# Patient Record
Sex: FEMALE | Race: WHITE | NOT HISPANIC OR LATINO | URBAN - METROPOLITAN AREA
[De-identification: names, ages, dates, MRNs, and addresses within clinical notes are randomized per-mention and may not be internally consistent; named-entity substitution may affect disease eponyms.]

---

## 2021-05-13 ENCOUNTER — INPATIENT (INPATIENT)
Facility: HOSPITAL | Age: 51
LOS: 0 days | Discharge: HOME | End: 2021-05-14
Attending: INTERNAL MEDICINE | Admitting: INTERNAL MEDICINE
Payer: MEDICAID

## 2021-05-13 VITALS
HEIGHT: 64 IN | RESPIRATION RATE: 16 BRPM | OXYGEN SATURATION: 100 % | WEIGHT: 149.91 LBS | DIASTOLIC BLOOD PRESSURE: 78 MMHG | TEMPERATURE: 98 F | HEART RATE: 80 BPM | SYSTOLIC BLOOD PRESSURE: 128 MMHG

## 2021-05-13 DIAGNOSIS — E83.39 OTHER DISORDERS OF PHOSPHORUS METABOLISM: ICD-10-CM

## 2021-05-13 DIAGNOSIS — R56.9 UNSPECIFIED CONVULSIONS: ICD-10-CM

## 2021-05-13 LAB
ALBUMIN SERPL ELPH-MCNC: 3.6 G/DL — SIGNIFICANT CHANGE UP (ref 3.3–5)
ALP SERPL-CCNC: 72 U/L — SIGNIFICANT CHANGE UP (ref 40–120)
ALT FLD-CCNC: 39 U/L — SIGNIFICANT CHANGE UP (ref 12–78)
ANION GAP SERPL CALC-SCNC: 5 MMOL/L — SIGNIFICANT CHANGE UP (ref 5–17)
APPEARANCE UR: CLEAR — SIGNIFICANT CHANGE UP
AST SERPL-CCNC: 36 U/L — SIGNIFICANT CHANGE UP (ref 15–37)
BACTERIA # UR AUTO: ABNORMAL
BASOPHILS # BLD AUTO: 0.05 K/UL — SIGNIFICANT CHANGE UP (ref 0–0.2)
BASOPHILS NFR BLD AUTO: 0.4 % — SIGNIFICANT CHANGE UP (ref 0–2)
BILIRUB SERPL-MCNC: 0.2 MG/DL — SIGNIFICANT CHANGE UP (ref 0.2–1.2)
BILIRUB UR-MCNC: NEGATIVE — SIGNIFICANT CHANGE UP
BUN SERPL-MCNC: 13 MG/DL — SIGNIFICANT CHANGE UP (ref 7–23)
CALCIUM SERPL-MCNC: 8.8 MG/DL — SIGNIFICANT CHANGE UP (ref 8.5–10.1)
CHLORIDE SERPL-SCNC: 104 MMOL/L — SIGNIFICANT CHANGE UP (ref 96–108)
CO2 SERPL-SCNC: 27 MMOL/L — SIGNIFICANT CHANGE UP (ref 22–31)
COLOR SPEC: YELLOW — SIGNIFICANT CHANGE UP
CREAT SERPL-MCNC: 0.74 MG/DL — SIGNIFICANT CHANGE UP (ref 0.5–1.3)
DIFF PNL FLD: ABNORMAL
EOSINOPHIL # BLD AUTO: 0 K/UL — SIGNIFICANT CHANGE UP (ref 0–0.5)
EOSINOPHIL NFR BLD AUTO: 0 % — SIGNIFICANT CHANGE UP (ref 0–6)
EPI CELLS # UR: SIGNIFICANT CHANGE UP
GLUCOSE SERPL-MCNC: 104 MG/DL — HIGH (ref 70–99)
GLUCOSE UR QL: NEGATIVE MG/DL — SIGNIFICANT CHANGE UP
HCG UR QL: NEGATIVE — SIGNIFICANT CHANGE UP
HCT VFR BLD CALC: 38.7 % — SIGNIFICANT CHANGE UP (ref 34.5–45)
HGB BLD-MCNC: 13.1 G/DL — SIGNIFICANT CHANGE UP (ref 11.5–15.5)
IMM GRANULOCYTES NFR BLD AUTO: 0.3 % — SIGNIFICANT CHANGE UP (ref 0–1.5)
KETONES UR-MCNC: NEGATIVE — SIGNIFICANT CHANGE UP
LEUKOCYTE ESTERASE UR-ACNC: NEGATIVE — SIGNIFICANT CHANGE UP
LYMPHOCYTES # BLD AUTO: 1.59 K/UL — SIGNIFICANT CHANGE UP (ref 1–3.3)
LYMPHOCYTES # BLD AUTO: 12.1 % — LOW (ref 13–44)
MAGNESIUM SERPL-MCNC: 2.4 MG/DL — SIGNIFICANT CHANGE UP (ref 1.6–2.6)
MCHC RBC-ENTMCNC: 30.3 PG — SIGNIFICANT CHANGE UP (ref 27–34)
MCHC RBC-ENTMCNC: 33.9 GM/DL — SIGNIFICANT CHANGE UP (ref 32–36)
MCV RBC AUTO: 89.4 FL — SIGNIFICANT CHANGE UP (ref 80–100)
MONOCYTES # BLD AUTO: 0.89 K/UL — SIGNIFICANT CHANGE UP (ref 0–0.9)
MONOCYTES NFR BLD AUTO: 6.8 % — SIGNIFICANT CHANGE UP (ref 2–14)
NEUTROPHILS # BLD AUTO: 10.55 K/UL — HIGH (ref 1.8–7.4)
NEUTROPHILS NFR BLD AUTO: 80.4 % — HIGH (ref 43–77)
NITRITE UR-MCNC: NEGATIVE — SIGNIFICANT CHANGE UP
NRBC # BLD: 0 /100 WBCS — SIGNIFICANT CHANGE UP (ref 0–0)
PH UR: 6 — SIGNIFICANT CHANGE UP (ref 5–8)
PHOSPHATE SERPL-MCNC: 0.6 MG/DL — CRITICAL LOW (ref 2.5–4.5)
PLATELET # BLD AUTO: 260 K/UL — SIGNIFICANT CHANGE UP (ref 150–400)
POTASSIUM SERPL-MCNC: 4.3 MMOL/L — SIGNIFICANT CHANGE UP (ref 3.5–5.3)
POTASSIUM SERPL-SCNC: 4.3 MMOL/L — SIGNIFICANT CHANGE UP (ref 3.5–5.3)
PROT SERPL-MCNC: 7.7 GM/DL — SIGNIFICANT CHANGE UP (ref 6–8.3)
PROT UR-MCNC: NEGATIVE MG/DL — SIGNIFICANT CHANGE UP
RBC # BLD: 4.33 M/UL — SIGNIFICANT CHANGE UP (ref 3.8–5.2)
RBC # FLD: 13.4 % — SIGNIFICANT CHANGE UP (ref 10.3–14.5)
RBC CASTS # UR COMP ASSIST: ABNORMAL /HPF (ref 0–4)
SODIUM SERPL-SCNC: 136 MMOL/L — SIGNIFICANT CHANGE UP (ref 135–145)
SP GR SPEC: 1.01 — SIGNIFICANT CHANGE UP (ref 1.01–1.02)
UROBILINOGEN FLD QL: NEGATIVE MG/DL — SIGNIFICANT CHANGE UP
WBC # BLD: 13.12 K/UL — HIGH (ref 3.8–10.5)
WBC # FLD AUTO: 13.12 K/UL — HIGH (ref 3.8–10.5)

## 2021-05-13 PROCEDURE — 99291 CRITICAL CARE FIRST HOUR: CPT

## 2021-05-13 PROCEDURE — 72125 CT NECK SPINE W/O DYE: CPT | Mod: 26

## 2021-05-13 PROCEDURE — 99222 1ST HOSP IP/OBS MODERATE 55: CPT

## 2021-05-13 PROCEDURE — 93010 ELECTROCARDIOGRAM REPORT: CPT

## 2021-05-13 PROCEDURE — 70450 CT HEAD/BRAIN W/O DYE: CPT | Mod: 26

## 2021-05-13 RX ORDER — LEVOTHYROXINE SODIUM 125 MCG
50 TABLET ORAL DAILY
Refills: 0 | Status: DISCONTINUED | OUTPATIENT
Start: 2021-05-13 | End: 2021-05-14

## 2021-05-13 RX ORDER — LEVETIRACETAM 250 MG/1
2000 TABLET, FILM COATED ORAL ONCE
Refills: 0 | Status: COMPLETED | OUTPATIENT
Start: 2021-05-13 | End: 2021-05-13

## 2021-05-13 RX ORDER — POTASSIUM PHOSPHATE, MONOBASIC POTASSIUM PHOSPHATE, DIBASIC 236; 224 MG/ML; MG/ML
30 INJECTION, SOLUTION INTRAVENOUS ONCE
Refills: 0 | Status: COMPLETED | OUTPATIENT
Start: 2021-05-13 | End: 2021-05-13

## 2021-05-13 RX ADMIN — LEVETIRACETAM 2000 MILLIGRAM(S): 250 TABLET, FILM COATED ORAL at 21:01

## 2021-05-13 RX ADMIN — Medication 30 MILLIGRAM(S): at 23:43

## 2021-05-13 RX ADMIN — POTASSIUM PHOSPHATE, MONOBASIC POTASSIUM PHOSPHATE, DIBASIC 83.33 MILLIMOLE(S): 236; 224 INJECTION, SOLUTION INTRAVENOUS at 21:01

## 2021-05-13 NOTE — ED ADULT TRIAGE NOTE - CHIEF COMPLAINT QUOTE
Seizure today, hit head on night stand, takes Keppra for seizure, H/O psych and is requesting a family member to stay with her Seizure today, hit head on night stand, takes Keppra for seizure, H/O psych and is requesting a family member to stay with her,    Covid vaccine completed Seizure today, hit head on night stand, takes Keppra for seizure, H/O psych and is requesting a family member to stay with her, forehead hematoma,    Covid vaccine completed

## 2021-05-13 NOTE — ED PROVIDER NOTE - OBJECTIVE STATEMENT
This patient is a 51 year old woman hx of seizure disorder on Keppra who presents to the ER after a seizure episode.  Patient was laying in bed when she had a seizure.  She believes that she may have hit head on the dresser that was next to her bed.  Currently in the ER she c/o headache at the area of swelling on the forehead.  She denies vomiting or neck pain.  Patient states that she is compliant with her medications.  Her last seizure was one week ago and the time prior to that was 5 months ago.  Her medications was recently increased 3 days ago by her neurologist.

## 2021-05-13 NOTE — ED ADULT NURSE NOTE - CHIEF COMPLAINT QUOTE
Seizure today, hit head on night stand, takes Keppra for seizure, H/O psych and is requesting a family member to stay with her, forehead hematoma,    Covid vaccine completed

## 2021-05-13 NOTE — H&P ADULT - HISTORY OF PRESENT ILLNESS
this is very pleasant 50 yo female with PMH of seizure disorder follow up Dr Ayesha LOYD, hypothyroidism presented to ED for seizure at home .as per pt. she had seizure last week and went to see her neurologist Dr Hodge increased keppra to 1.75 gm at am and 2 gm at pm since 5/10/2021. she has been faithfully taking her meds. today she felt tired and went to bed and then she had a seizure. it associated postictal confusion, tongue biting and urinary incontinence. she also bumped her head to dresser since she has forehead hematoma.. she denies fever chills. no dysuria or diarrhea. she reported good appetite. she has no other co

## 2021-05-13 NOTE — H&P ADULT - PROBLEM SELECTOR PLAN 1
has no focal neurologic deficits  c.w neuro check, seizure precaution  c.w keppra 1.75 mg at am and 2 gm at pm.   consider neurology consult

## 2021-05-13 NOTE — H&P ADULT - NSHPPHYSICALEXAM_GEN_ALL_CORE
Physical exam:   General: patient in no acute distress, resting comfortably  Head:  forehead hematoma   Eyes: EOMI, PERRLA, clear sclera  Neck: Supple, thyroid nontender, non enlarged  Cardio: S1/S2 +ve, regular rate and rhythm, no M/G/R  Resp: clear to ausculation bilaterally, no rales or wheezes  GI: abdomen soft, nontender, non distended, no guarding, BS +ve x 4  Ext: no significant pedal edema  Neuro: CN 2-12 intact, no significant motor or sensory deficits.  Skin: No rashes or lesions

## 2021-05-13 NOTE — ED PROVIDER NOTE - CRITICAL CARE ATTENDING CONTRIBUTION TO CARE
Patient with hx of seizure neurologically intact said to be compliant with medications.  Critical hypophosphatemia on labs IV infusion started.

## 2021-05-13 NOTE — ED PROVIDER NOTE - CLINICAL SUMMARY MEDICAL DECISION MAKING FREE TEXT BOX
Patient hx of seizure recent increase in her seizure medications patient with hematoma to forehead.  Will check CT r/o ICH.  Patient compliant with medication will check labs for electrolytes.

## 2021-05-13 NOTE — H&P ADULT - NSHPLABSRESULTS_GEN_ALL_CORE
Vital Signs Last 24 Hrs  T(C): 36.7 (13 May 2021 20:11), Max: 36.7 (13 May 2021 17:49)  T(F): 98.1 (13 May 2021 20:11), Max: 98.1 (13 May 2021 20:11)  HR: 85 (13 May 2021 20:11) (80 - 85)  BP: 122/68 (13 May 2021 20:11) (122/68 - 128/78)  BP(mean): --  RR: 20 (13 May 2021 20:11) (16 - 20)  SpO2: 100% (13 May 2021 20:11) (100% - 100%)    CBC Full  -  ( 13 May 2021 18:56 )  WBC Count : 13.12 K/uL  RBC Count : 4.33 M/uL  Hemoglobin : 13.1 g/dL  Hematocrit : 38.7 %  Platelet Count - Automated : 260 K/uL  Mean Cell Volume : 89.4 fl  Mean Cell Hemoglobin : 30.3 pg  Mean Cell Hemoglobin Concentration : 33.9 gm/dL  Auto Neutrophil # : 10.55 K/uL  Auto Lymphocyte # : 1.59 K/uL  Auto Monocyte # : 0.89 K/uL  Auto Eosinophil # : 0.00 K/uL  Auto Basophil # : 0.05 K/uL  Auto Neutrophil % : 80.4 %  Auto Lymphocyte % : 12.1 %  Auto Monocyte % : 6.8 %  Auto Eosinophil % : 0.0 %  Auto Basophil % : 0.4 %    05-13    136  |  104  |  13  ----------------------------<  104<H>  4.3   |  27  |  0.74    Ca    8.8      13 May 2021 18:56  Phos  0.6     05-13  Mg     2.4     05-13    TPro  7.7  /  Alb  3.6  /  TBili  0.2  /  DBili  x   /  AST  36  /  ALT  39  /  AlkPhos  72  05-13      Home Medications:  Keppra:  (13 May 2021 21:00)  levothyroxine:  (13 May 2021 21:00)  PARoxetine 30 mg oral tablet: 1 tab(s) orally once a day (13 May 2021 21:00)  Paxil:  (13 May 2021 21:00)    LIVER FUNCTIONS - ( 13 May 2021 18:56 )  Alb: 3.6 g/dL / Pro: 7.7 gm/dL / ALK PHOS: 72 U/L / ALT: 39 U/L / AST: 36 U/L / GGT: x

## 2021-05-13 NOTE — H&P ADULT - NSHPREVIEWOFSYSTEMS_GEN_ALL_CORE
Constitutional: no fever, chills, night sweats  Ears: no hearing changes or ear pain,   Nose: no nasal congestion, sinus pain, or rhinorrhea  Cardio: no chest pain, orthopnea, edema, or palpitations  Resp: no dyspnea, cough, wheezing  GI: no nausea, vomiting, diarrhea, constipation, hematochezia, or melena  : no dysuria, urinary frequency, hematuria  MSK: no back pain, neck pain  Skin: no rash, pruritis   Neuro: seizures and hematoma on forehead

## 2021-05-13 NOTE — ED ADULT NURSE NOTE - OBJECTIVE STATEMENT
pt A&Ox4 s/p seizure today. pt sustained head injury. hematoma to forehead. pt also with c/o nausea. pt reports LOC. Last seizure was last week. per sister at bedside, Keppa dose was increased on Monday. denies Dizziness, CP and SOB. PMH Hypothyroidism, Depression, Seizures

## 2021-05-13 NOTE — H&P ADULT - ASSESSMENT
this is very pleasant 52 yo female with PMH of seizure disorder follow up Dr Ayesha LOYD, hypothyroidism presented to ED for seizure at home .as per pt. she had seizure last week and went to see her neurologist Dr Hodge increased keppra to 1.75 gm at am and 2 gm at pm since 5/10/2021. she has been faithfully taking her meds. today she felt tired and went to bed and then she had a seizure. it associated postictal confusion, tongue biting and urinary incontinence. she also bumped her head to dresser since she has forehead hematoma.. she denies fever chills. no dysuria or diarrhea. she reported good appetite. she has no other co

## 2021-05-14 VITALS
OXYGEN SATURATION: 98 % | HEART RATE: 85 BPM | SYSTOLIC BLOOD PRESSURE: 116 MMHG | RESPIRATION RATE: 17 BRPM | DIASTOLIC BLOOD PRESSURE: 72 MMHG | TEMPERATURE: 98 F

## 2021-05-14 LAB
ANION GAP SERPL CALC-SCNC: 5 MMOL/L — SIGNIFICANT CHANGE UP (ref 5–17)
BUN SERPL-MCNC: 10 MG/DL — SIGNIFICANT CHANGE UP (ref 7–23)
CALCIUM SERPL-MCNC: 8.4 MG/DL — LOW (ref 8.5–10.1)
CHLORIDE SERPL-SCNC: 106 MMOL/L — SIGNIFICANT CHANGE UP (ref 96–108)
CO2 SERPL-SCNC: 29 MMOL/L — SIGNIFICANT CHANGE UP (ref 22–31)
COVID-19 SPIKE DOMAIN AB INTERP: POSITIVE
COVID-19 SPIKE DOMAIN ANTIBODY RESULT: >250 U/ML — HIGH
CREAT SERPL-MCNC: 0.66 MG/DL — SIGNIFICANT CHANGE UP (ref 0.5–1.3)
FLUAV AG NPH QL: SIGNIFICANT CHANGE UP
FLUBV AG NPH QL: SIGNIFICANT CHANGE UP
GLUCOSE SERPL-MCNC: 92 MG/DL — SIGNIFICANT CHANGE UP (ref 70–99)
HCT VFR BLD CALC: 36.9 % — SIGNIFICANT CHANGE UP (ref 34.5–45)
HGB BLD-MCNC: 12.4 G/DL — SIGNIFICANT CHANGE UP (ref 11.5–15.5)
MCHC RBC-ENTMCNC: 30.3 PG — SIGNIFICANT CHANGE UP (ref 27–34)
MCHC RBC-ENTMCNC: 33.6 GM/DL — SIGNIFICANT CHANGE UP (ref 32–36)
MCV RBC AUTO: 90.2 FL — SIGNIFICANT CHANGE UP (ref 80–100)
NRBC # BLD: 0 /100 WBCS — SIGNIFICANT CHANGE UP (ref 0–0)
PHOSPHATE SERPL-MCNC: 3.6 MG/DL — SIGNIFICANT CHANGE UP (ref 2.5–4.5)
PLATELET # BLD AUTO: 231 K/UL — SIGNIFICANT CHANGE UP (ref 150–400)
POTASSIUM SERPL-MCNC: 4.2 MMOL/L — SIGNIFICANT CHANGE UP (ref 3.5–5.3)
POTASSIUM SERPL-SCNC: 4.2 MMOL/L — SIGNIFICANT CHANGE UP (ref 3.5–5.3)
RBC # BLD: 4.09 M/UL — SIGNIFICANT CHANGE UP (ref 3.8–5.2)
RBC # FLD: 13.7 % — SIGNIFICANT CHANGE UP (ref 10.3–14.5)
SARS-COV-2 IGG+IGM SERPL QL IA: >250 U/ML — HIGH
SARS-COV-2 IGG+IGM SERPL QL IA: POSITIVE
SARS-COV-2 RNA SPEC QL NAA+PROBE: SIGNIFICANT CHANGE UP
SODIUM SERPL-SCNC: 140 MMOL/L — SIGNIFICANT CHANGE UP (ref 135–145)
WBC # BLD: 9.49 K/UL — SIGNIFICANT CHANGE UP (ref 3.8–10.5)
WBC # FLD AUTO: 9.49 K/UL — SIGNIFICANT CHANGE UP (ref 3.8–10.5)

## 2021-05-14 PROCEDURE — 99239 HOSP IP/OBS DSCHRG MGMT >30: CPT

## 2021-05-14 RX ORDER — LACOSAMIDE 50 MG/1
50 TABLET ORAL
Refills: 0 | Status: DISCONTINUED | OUTPATIENT
Start: 2021-05-14 | End: 2021-05-14

## 2021-05-14 RX ORDER — LEVETIRACETAM 250 MG/1
1500 TABLET, FILM COATED ORAL
Refills: 0 | Status: DISCONTINUED | OUTPATIENT
Start: 2021-05-14 | End: 2021-05-14

## 2021-05-14 RX ORDER — LACOSAMIDE 50 MG/1
1 TABLET ORAL
Qty: 60 | Refills: 0
Start: 2021-05-14

## 2021-05-14 RX ORDER — LEVETIRACETAM 250 MG/1
1500 TABLET, FILM COATED ORAL ONCE
Refills: 0 | Status: COMPLETED | OUTPATIENT
Start: 2021-05-14 | End: 2021-05-14

## 2021-05-14 RX ORDER — LEVETIRACETAM 250 MG/1
0 TABLET, FILM COATED ORAL
Qty: 0 | Refills: 0 | DISCHARGE

## 2021-05-14 RX ORDER — LEVOTHYROXINE SODIUM 125 MCG
0 TABLET ORAL
Qty: 0 | Refills: 0 | DISCHARGE

## 2021-05-14 RX ORDER — LEVETIRACETAM 250 MG/1
2 TABLET, FILM COATED ORAL
Qty: 0 | Refills: 0 | DISCHARGE
Start: 2021-05-14

## 2021-05-14 RX ORDER — LEVETIRACETAM 250 MG/1
0 TABLET, FILM COATED ORAL
Qty: 0 | Refills: 0 | DISCHARGE
Start: 2021-05-14

## 2021-05-14 RX ADMIN — LEVETIRACETAM 1500 MILLIGRAM(S): 250 TABLET, FILM COATED ORAL at 18:03

## 2021-05-14 RX ADMIN — Medication 50 MICROGRAM(S): at 05:57

## 2021-05-14 RX ADMIN — LACOSAMIDE 50 MILLIGRAM(S): 50 TABLET ORAL at 18:02

## 2021-05-14 RX ADMIN — LEVETIRACETAM 1500 MILLIGRAM(S): 250 TABLET, FILM COATED ORAL at 10:22

## 2021-05-14 NOTE — PROGRESS NOTE ADULT - ASSESSMENT
52yo F PMH Seizure Disorder, Hypothyroidism, admitted for seizure.      # Seizure disorder - on Keppra 1.76g qam, 2g qpm.  Discussed adjusting dosage with her Neurologist Dr. Hodge.  In meantime, start Vimpat 50mg BID per neurology recommendations.    # Acquired Hypothyroidism - continue Synthroid.  # Hypophosphatemia - likely due to seizure.  Renal input noted.    Stable for d/c home.  Discussed outpatient Neuro followup.  Message left for her neurologist Dr. Heard, [place], [place]

## 2021-05-14 NOTE — DISCHARGE NOTE PROVIDER - NSDCFUADDINST_GEN_ALL_CORE_FT
It is important to see your primary physician as well as the physicians noted below within the next week to perform a comprehensive medical review.  Call their offices for an appointment as soon as you leave the hospital.  You will also need to see them for renewal of your medications.  If you do not have a primary physician, contact the [facility] Physician Referral Service at [phone].  To obtain your results, you can access the [facility] Patient Portal at [email or web address].  Your medical issues appear to be stable at this time, but if your symptoms recur or worsen, contact your physicians and/or return to the hospital if necessary.  If you encounter any issues or questions with your medication, call your physicians before stopping the medication.  Do not drive.  Limit your diet to 2 grams of sodium daily.

## 2021-05-14 NOTE — DISCHARGE NOTE PROVIDER - CARE PROVIDER_API CALL
[facility],   Phone: (   )    -  Fax: (   )    -  Follow Up Time:     Arian Simon)  Neurology  [address], [address]  [place] [address]  Phone: [phone]  Fax: [phone]  Follow Up Time:

## 2021-05-14 NOTE — DISCHARGE NOTE PROVIDER - HOSPITAL COURSE
50yo F PMH Seizure Disorder, Hypothyroidism, admitted for seizure.      # Seizure disorder - on Keppra 1.76g qam, 2g qpm.  Discussed adjusting dosage with her Neurologist Dr. Hodge.  In meantime, start Vimpat 50mg BID per neurology recommendations.    # Acquired Hypothyroidism - continue Synthroid.  # Hypophosphatemia - likely due to seizure.  Renal input noted.    Stable for d/c home.  Discussed outpatient Neuro followup.  Message left for her neurologist Dr. Heard, [place], [place]    Disposition: Stable for discharge.  Outpatient followup discussed.  Total time spent on discharge is  35  minutes.

## 2021-05-14 NOTE — DISCHARGE NOTE PROVIDER - NSDCCPCAREPLAN_GEN_ALL_CORE_FT
PRINCIPAL DISCHARGE DIAGNOSIS  Diagnosis: Seizure  Assessment and Plan of Treatment:       SECONDARY DISCHARGE DIAGNOSES  Diagnosis: Seizure  Assessment and Plan of Treatment:     Diagnosis: Hypophosphatemia  Assessment and Plan of Treatment:     Diagnosis: Hypothyroidism  Assessment and Plan of Treatment:

## 2021-05-14 NOTE — PROGRESS NOTE ADULT - SUBJECTIVE AND OBJECTIVE BOX
Patient: PAYTON LAUREANO [id number] 51y Female                            Hospitalist Attending Note    No complaints.   Feels at baseline.  Requesting d/c home.     ____________________PHYSICAL EXAM:  GENERAL:  NAD Alert and Oriented x 3   HEENT: NCAT  CARDIOVASCULAR:  S1, S2  LUNGS: CTAB  ABDOMEN:  soft, (-) tenderness, (-) distension, (+) bowel sounds, (-) guarding, (-) rebound (-) rigidity  EXTREMITIES:  no cyanosis / clubbing / edema.   ____________________     VITALS:  Vital Signs Last 24 Hrs  T(C): 36.8 (14 May 2021 09:58), Max: 36.8 (14 May 2021 09:58)  T(F): 98.3 (14 May 2021 09:58), Max: 98.3 (14 May 2021 09:58)  HR: 90 (14 May 2021 09:58) (67 - 90)  BP: 105/63 (14 May 2021 09:58) (100/57 - 128/78)  BP(mean): --  RR: 17 (14 May 2021 09:58) (16 - 20)  SpO2: 94% (14 May 2021 09:58) (94% - 100%) Daily Height in cm: 162.56 (13 May 2021 17:49)    Daily   CAPILLARY BLOOD GLUCOSE        I&O's Summary      HISTORY:  PAST MEDICAL & SURGICAL HISTORY:  No pertinent past medical history    No significant past surgical history    Allergies    lamotrigine (Rash)    Intolerances       LABS:                        12.4   9.49  )-----------( 231      ( 14 May 2021 06:46 )             36.9     05-14    140  |  106  |  10  ----------------------------<  92  4.2   |  29  |  0.66    Ca    8.4<L>      14 May 2021 06:46  Phos  3.6     05-14  Mg     2.4     05-13    TPro  7.7  /  Alb  3.6  /  TBili  0.2  /  DBili  x   /  AST  36  /  ALT  39  /  AlkPhos  72  05-13      LIVER FUNCTIONS - ( 13 May 2021 18:56 )  Alb: 3.6 g/dL / Pro: 7.7 gm/dL / ALK PHOS: 72 U/L / ALT: 39 U/L / AST: 36 U/L / GGT: x           Urinalysis Basic - ( 13 May 2021 20:21 )    Color: Yellow / Appearance: Clear / S.010 / pH: x  Gluc: x / Ketone: Negative  / Bili: Negative / Urobili: Negative mg/dL   Blood: x / Protein: Negative mg/dL / Nitrite: Negative   Leuk Esterase: Negative / RBC: 3-5 /HPF / WBC x   Sq Epi: x / Non Sq Epi: Few / Bacteria: Moderate              MEDICATIONS:  MEDICATIONS  (STANDING):  lacosamide 50 milliGRAM(s) Oral two times a day  levETIRAcetam 1500 milliGRAM(s) Oral two times a day  levothyroxine  Oral Tab/Cap - Peds 50 MICROGram(s) Oral daily  PARoxetine 30 milliGRAM(s) Oral daily    MEDICATIONS  (PRN):

## 2021-05-14 NOTE — CONSULT NOTE ADULT - ASSESSMENT
52 yo female with PMH of seizure disorder follow up Dr Ayesha LOYD, hypothyroidism presented to ED for seizure at home [facility] neurologist Dr Hodge increased keppra to 1.75 gm at am and 2 gm at pm since 5/10/2021. PE non-focal CTH-      Impression: Breakthrough seizure    Continue with Keppra, does not appear toxic but discussed potentially lowering it when she sees her neurologist  Would ad second agent consider starting Vimpat 50 BID with further  titration as an outpt  No further w/u

## 2021-05-14 NOTE — DISCHARGE NOTE PROVIDER - NSDCMRMEDTOKEN_GEN_ALL_CORE_FT
lacosamide 50 mg oral tablet: 1 tab(s) orally 2 times a day MDD:2  levETIRAcetam 750 mg oral tablet: 1750mg po qam, 2g po qpm  levothyroxine: orally once a day  50mcg  PARoxetine 30 mg oral tablet: 1 tab(s) orally once a day

## 2021-05-14 NOTE — CONSULT NOTE ADULT - SUBJECTIVE AND OBJECTIVE BOX
NEPHROLOGY CONSULTATION    CHIEF COMPLAINT:  Hypophosphatemia    HPI:  She had a grand mal seizure at home yesterday and came to ER and blood work showed severe hypophosphatemia to 0.6 mg/dL.  PO4 level is normal this AM.  She was supplemented IV.  She denies any history of such.  She has along history of epilepsy with good control x 20 years but seizures are recurring again.      ROS:  denies muscle pain, hx kidney stones      PAST MEDICAL & SURGICAL HISTORY:  No pertinent past medical history    No significant past surgical history        SOCIAL HISTORY:  NA    FAMILY HISTORY:  NA      MEDICATIONS  (STANDING):  levETIRAcetam 1500 milliGRAM(s) Oral two times a day  levothyroxine  Oral Tab/Cap - Peds 50 MICROGram(s) Oral daily  PARoxetine 30 milliGRAM(s) Oral daily      PHYSICAL EXAMINATION:  T(F): 98.3 (21 @ 09:58)  HR: 90 (21 @ 09:58)  BP: 105/63 (21 @ 09:58)  RR: 17 (21 @ 09:58)  SpO2: 94% (21 @ 09:58)  Conversant, no apparent distress  PERRLA, pink conjunctivae, no ptosis  Good dentition, no pharyngeal erythema  Neck non tender, no mass, no thyromegaly or nodules  Normal respiratory effort, lungs clear to auscultation  Heart with RRR, no murmurs or rubs, no peripheral edema  Abdomen soft, no masses, no organomegaly  Skin no rashes, ulcers or lesions, normal turgor and temperature  Appropriate affect, AO x 3    LABS:                        12.4   9.49  )-----------( 231      ( 14 May 2021 06:46 )             36.9     -14    140  |  106  |  10  ----------------------------<  92  4.2   |  29  |  0.66    Ca    8.4<L>      14 May 2021 06:46  Phos  3.6       Mg     2.4         TPro  7.7  /  Alb  3.6  /  TBili  0.2  /  DBili  x   /  AST  36  /  ALT  39  /  AlkPhos  72  -    Urinalysis Basic - ( 13 May 2021 20:21 )  Color: Yellow / Appearance: Clear / S.010 / pH: x  Gluc: x / Ketone: Negative  / Bili: Negative / Urobili: Negative mg/dL   Blood: x / Protein: Negative mg/dL / Nitrite: Negative   Leuk Esterase: Negative / RBC: 3-5 /HPF / WBC x   Sq Epi: x / Non Sq Epi: Few / Bacteria: Moderate      ASSESSMENT:  1.  Hypophosphatemia is much more likely a result of the tonic clonic seizure than a cause of it    PLAN:  No further renal workup or treatment is needed                
Neurology consult    PAYTON LAUREANO51yFemale     Patient is a 51y old  Female who presents with a chief complaint of seizure (14 May 2021 12:20)      HPI:  this is very pleasant 52 yo female with PMH of seizure disorder follow up Dr Ayesha LOYD, hypothyroidism presented to ED for seizure at home .as per pt. she had seizure last week and went to see her neurologist Dr Hodge increased keppra to 1.75 gm at am and 2 gm at pm since 5/10/2021. she has been faithfully taking her meds. today she felt tired and went to bed and then she had a seizure. it associated postictal confusion, tongue biting and urinary incontinence. she also bumped her head to dresser since she has forehead hematoma.. she denies fever chills. no dysuria or diarrhea. she reported good appetite. she has no other co (13 May 2021 22:35)      no difficulty with language.  No vision loss or double vision.  No dizziness, vertigo or new hearing loss.  . No difficulty with swallowing.  No focal weakness.  No focal sensory changes.  No numbness or tingling in the bilateral lower extremities.  No difficulty with balance.  No difficulty with ambulation.    REVIEW OF SYSTEMS:    Constitutional: No fever, chills, fatigue, weakness  Eyes: no eye pain, visual disturbances, or discharge  ENT:  No difficulty hearing, tinnitus, vertigo; No sinus or throat pain  Neck: No pain or stiffness  Respiratory: No cough, dyspnea, wheezing   Cardiovascular: No chest pain, palpitations,   Gastrointestinal: No abdominal or epigastric pain. No nausea, vomiting  No diarrhea or constipation.   Genitourinary: No dysuria, frequency, hematuria or incontinence  Neurological: No headaches, lightheadedness, vertigo, numbness or tremors  Psychiatric: No depression, anxiety, mood swings or difficulty sleeping  Musculoskeletal: No joint pain or swelling; No muscle, back or extremity pain  Skin: No itching, burning, rashes or lesions   Lymph Nodes: No enlarged glands  Endocrine: No heat or cold intolerance; No hair loss, No h/o diabetes or thyroid dysfunction  Allergy and Immunologic: No hives or eczema    MEDICATIONS    levETIRAcetam 1500 milliGRAM(s) Oral two times a day  levothyroxine  Oral Tab/Cap - Peds 50 MICROGram(s) Oral daily  PARoxetine 30 milliGRAM(s) Oral daily      PMH: No pertinent past medical history         PSH: No significant past surgical history        Family history: No history of dementia, strokes, or seizures   FAMILY HISTORY:      SOCIAL HISTORY:  No history of tobacco or alcohol use     Allergies    lamotrigine (Rash)    Intolerances        Height (cm): 162.6 ( @ 17:49)  Weight (kg): 70.3 ( @ 03:24)  BMI (kg/m2): 26.6 ( @ 03:24)    Vital Signs Last 24 Hrs  T(C): 36.8 (14 May 2021 09:58), Max: 36.8 (14 May 2021 09:58)  T(F): 98.3 (14 May 2021 09:58), Max: 98.3 (14 May 2021 09:58)  HR: 90 (14 May 2021 09:58) (67 - 90)  BP: 105/63 (14 May 2021 09:58) (100/57 - 128/78)  BP(mean): --  RR: 17 (14 May 2021 09:58) (16 - 20)  SpO2: 94% (14 May 2021 09:58) (94% - 100%)      On Neurological Examination:    Mental Status - Patient is alert, awake, oriented X3. fluent, names, no dysarthria no aphasia Follows commands well and able to answer questions appropriately. Mood and affect  normal    Cranial Nerves - PERRL, EOMI, VFF, V1-V3 intact, no gross facial asymmetry, tongue/uvula midline    Motor Exam -   5/5   nml bulk/tone    Sensory    Intact to light touch and pinprick bilaterally    Coord: FTN intact bilaterally     Gait -  normal      Reflexes:          2+ throughout                                               GENERAL Exam:     Nontoxic , No Acute Distress   	  HEENT:  normocephalic, atraumatic  		  LUNGS:	Clear bilaterally  No Wheeze    	  HEART:	 Normal S1S2   No murmur RRR        	  GI/ ABDOMEN:  Soft  Non tender    EXTREMITIES:   No Edema  No Clubbing  No Cyanosis No Edema    MUSCULOSKELETAL: Normal Range of Motion  	   SKIN:      Normal   No Ecchymosis               LABS:  CBC Full  -  ( 14 May 2021 06:46 )  WBC Count : 9.49 K/uL  RBC Count : 4.09 M/uL  Hemoglobin : 12.4 g/dL  Hematocrit : 36.9 %  Platelet Count - Automated : 231 K/uL  Mean Cell Volume : 90.2 fl  Mean Cell Hemoglobin : 30.3 pg  Mean Cell Hemoglobin Concentration : 33.6 gm/dL  Auto Neutrophil # : x  Auto Lymphocyte # : x  Auto Monocyte # : x  Auto Eosinophil # : x  Auto Basophil # : x  Auto Neutrophil % : x  Auto Lymphocyte % : x  Auto Monocyte % : x  Auto Eosinophil % : x  Auto Basophil % : x    Urinalysis Basic - ( 13 May 2021 20:21 )    Color: Yellow / Appearance: Clear / S.010 / pH: x  Gluc: x / Ketone: Negative  / Bili: Negative / Urobili: Negative mg/dL   Blood: x / Protein: Negative mg/dL / Nitrite: Negative   Leuk Esterase: Negative / RBC: 3-5 /HPF / WBC x   Sq Epi: x / Non Sq Epi: Few / Bacteria: Moderate          140  |  106  |  10  ----------------------------<  92  4.2   |  29  |  0.66    Ca    8.4<L>      14 May 2021 06:46  Phos  3.6       Mg     2.4         TPro  7.7  /  Alb  3.6  /  TBili  0.2  /  DBili  x   /  AST  36  /  ALT  39  /  AlkPhos  72      LIVER FUNCTIONS - ( 13 May 2021 18:56 )  Alb: 3.6 g/dL / Pro: 7.7 gm/dL / ALK PHOS: 72 U/L / ALT: 39 U/L / AST: 36 U/L / GGT: x           Hemoglobin A1C:             RADIOLOGY  < from: CT Head No Cont (21 @ 19:20) >  IMPRESSION:    No CT evidence of acute traumatic injury to the head and cervical spine.    MRI may be obtained, if further information regarding ligamentous injury, hematoma or spinal cord pathology is required.            AUDI MANNING MD; Attending Radiologist  This document has been electronically signed. Ma    < end of copied text >

## 2021-05-14 NOTE — DISCHARGE NOTE NURSING/CASE MANAGEMENT/SOCIAL WORK - PATIENT PORTAL LINK FT
You can access the FollowMyHealth Patient Portal offered by [facility] by registering at the following website: [email or web address]. By joining [facility]’s FollowMyHealth portal, you will also be able to view your health information using other applications (apps) compatible with our system.
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
## 2021-05-21 DIAGNOSIS — E03.9 HYPOTHYROIDISM, UNSPECIFIED: ICD-10-CM
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
## 2021-05-21 DIAGNOSIS — S00.83XA CONTUSION OF OTHER PART OF HEAD, INITIAL ENCOUNTER: ICD-10-CM

## 2021-05-21 DIAGNOSIS — E83.39 OTHER DISORDERS OF PHOSPHORUS METABOLISM: ICD-10-CM

## 2021-05-21 DIAGNOSIS — G40.409 OTHER GENERALIZED EPILEPSY AND EPILEPTIC SYNDROMES, NOT INTRACTABLE, WITHOUT STATUS EPILEPTICUS: ICD-10-CM

## 2021-06-08 ENCOUNTER — INPATIENT (INPATIENT)
Facility: HOSPITAL | Age: 51
LOS: 3 days | Discharge: HOME | DRG: 644 | End: 2021-06-12
Attending: PSYCHIATRY & NEUROLOGY | Admitting: INTERNAL MEDICINE
Payer: MEDICAID

## 2021-06-08 VITALS
OXYGEN SATURATION: 97 % | RESPIRATION RATE: 16 BRPM | HEART RATE: 78 BPM | WEIGHT: 149.91 LBS | HEIGHT: 64 IN | DIASTOLIC BLOOD PRESSURE: 82 MMHG | TEMPERATURE: 99 F | SYSTOLIC BLOOD PRESSURE: 150 MMHG

## 2021-06-08 PROCEDURE — 99285 EMERGENCY DEPT VISIT HI MDM: CPT

## 2021-06-08 NOTE — ED PROVIDER NOTE - PHYSICAL EXAMINATION
Gen: WDWN, NAD  HEENT: EOMI, no nasal discharge, mucous membranes moist  CV: RRR, no M/R/G  Resp: CTAB, no W/R/R  GI: Abdomen soft non-distended, NTTP, no masses  MSK: No open wounds, no bruising, no LE edema or swelling  Neuro: A&Ox4, following commands, moving all four extremities spontaneously  Psych: appropriate mood, denies AH, VH, SI

## 2021-06-08 NOTE — ED PROVIDER NOTE - NS ED ROS FT
CONST: no fevers, no chills, no lightheadedness  HEENT: no vision change, no sore throat  CV: no chest pain, no palpitations  RESP: no cough, no shortness of breath  ABD: no abdominal pain, no nausea/vomiting, no diarrhea  : no dysuria, no hematuria  ENDO: no frequent urination, no unusual thirst  MSK: no musculoskeletal pain  NEURO: no headache, no focal weakness, no loss of sensation  SKIN:  no rash

## 2021-06-08 NOTE — ED CLERICAL - NS ED CLERK NOTE PRE-ARRIVAL INFORMATION; ADDITIONAL PRE-ARRIVAL INFORMATION
CC/Reason For referral: seizure  Preferred Consultant(if applicable):  Who admits for you (if needed):  Do you have documents you would like to fax over?  Would you still like to speak to an ED attending? CC/Reason For referral: seizure  Preferred Consultant(if applicable):  Who admits for you (if needed): Abrudescu  Do you have documents you would like to fax over?  Would you still like to speak to an ED attending?

## 2021-06-08 NOTE — ED PROVIDER NOTE - CLINICAL SUMMARY MEDICAL DECISION MAKING FREE TEXT BOX
51F hx seizures on oxcarbazepine, keppra p/w asymptomatic hyponatremia. exam unremarkable. Suspect likely 2/2 medication induced (oxcarbazepine), however will send urine lytes, tsh, cortisol to r/o further potential source, rpt cbc/cmp, admit for further w/o, continue keppra and halt oxcarpazepine for now.

## 2021-06-08 NOTE — ED PROVIDER NOTE - ATTENDING CONTRIBUTION TO CARE
Pt sent in for outpt labs showing hyponatremia. pt is completely asymptomatic here, with benign exam, unremarkable vitals. Pt's sodium here was indeed low at 124 along with hypochloremia, otherwise no major lab abnormalities. pt will be admitted for further care of hyponatremia.

## 2021-06-08 NOTE — ED ADULT TRIAGE NOTE - CHIEF COMPLAINT QUOTE
Pt on new seizure medication, was sent by MD for low sodium on blood work run today and yesterday. Pt denies any seizure activity in the last 2 days.

## 2021-06-08 NOTE — ED PROVIDER NOTE - OBJECTIVE STATEMENT
51F hx seizures, depression p/w hyponatremia. Pt w/ last seizure in mid-may, started on oxcarbazepine BID. Noted on routine labwork y/d to be hyponatremic to 127. States asymptomatic otherwise. No fever, n/v/d/c, chest / abd pain, cough, sob, dizziness, dysuria/hematuria.

## 2021-06-09 DIAGNOSIS — E87.1 HYPO-OSMOLALITY AND HYPONATREMIA: ICD-10-CM

## 2021-06-09 LAB
ALBUMIN SERPL ELPH-MCNC: 4.2 G/DL — SIGNIFICANT CHANGE UP (ref 3.3–5)
ALP SERPL-CCNC: 81 U/L — SIGNIFICANT CHANGE UP (ref 40–120)
ALT FLD-CCNC: 34 U/L — SIGNIFICANT CHANGE UP (ref 10–45)
ANION GAP SERPL CALC-SCNC: 10 MMOL/L — SIGNIFICANT CHANGE UP (ref 5–17)
ANION GAP SERPL CALC-SCNC: 11 MMOL/L — SIGNIFICANT CHANGE UP (ref 5–17)
APPEARANCE UR: CLEAR — SIGNIFICANT CHANGE UP
AST SERPL-CCNC: 19 U/L — SIGNIFICANT CHANGE UP (ref 10–40)
BACTERIA # UR AUTO: NEGATIVE — SIGNIFICANT CHANGE UP
BASOPHILS # BLD AUTO: 0.03 K/UL — SIGNIFICANT CHANGE UP (ref 0–0.2)
BASOPHILS NFR BLD AUTO: 0.3 % — SIGNIFICANT CHANGE UP (ref 0–2)
BILIRUB SERPL-MCNC: 0.2 MG/DL — SIGNIFICANT CHANGE UP (ref 0.2–1.2)
BILIRUB UR-MCNC: NEGATIVE — SIGNIFICANT CHANGE UP
BUN SERPL-MCNC: 10 MG/DL — SIGNIFICANT CHANGE UP (ref 7–23)
BUN SERPL-MCNC: 14 MG/DL — SIGNIFICANT CHANGE UP (ref 7–23)
CALCIUM SERPL-MCNC: 8.9 MG/DL — SIGNIFICANT CHANGE UP (ref 8.4–10.5)
CALCIUM SERPL-MCNC: 9.5 MG/DL — SIGNIFICANT CHANGE UP (ref 8.4–10.5)
CHLORIDE SERPL-SCNC: 91 MMOL/L — LOW (ref 96–108)
CHLORIDE SERPL-SCNC: 93 MMOL/L — LOW (ref 96–108)
CHLORIDE UR-SCNC: 58 MMOL/L — SIGNIFICANT CHANGE UP
CO2 SERPL-SCNC: 23 MMOL/L — SIGNIFICANT CHANGE UP (ref 22–31)
CO2 SERPL-SCNC: 23 MMOL/L — SIGNIFICANT CHANGE UP (ref 22–31)
COLOR SPEC: SIGNIFICANT CHANGE UP
CREAT ?TM UR-MCNC: 54 MG/DL — SIGNIFICANT CHANGE UP
CREAT SERPL-MCNC: 0.6 MG/DL — SIGNIFICANT CHANGE UP (ref 0.5–1.3)
CREAT SERPL-MCNC: 0.66 MG/DL — SIGNIFICANT CHANGE UP (ref 0.5–1.3)
DIFF PNL FLD: ABNORMAL
EOSINOPHIL # BLD AUTO: 0.13 K/UL — SIGNIFICANT CHANGE UP (ref 0–0.5)
EOSINOPHIL NFR BLD AUTO: 1.4 % — SIGNIFICANT CHANGE UP (ref 0–6)
EPI CELLS # UR: 2 /HPF — SIGNIFICANT CHANGE UP
GLUCOSE SERPL-MCNC: 103 MG/DL — HIGH (ref 70–99)
GLUCOSE SERPL-MCNC: 88 MG/DL — SIGNIFICANT CHANGE UP (ref 70–99)
GLUCOSE UR QL: NEGATIVE — SIGNIFICANT CHANGE UP
HCT VFR BLD CALC: 37.1 % — SIGNIFICANT CHANGE UP (ref 34.5–45)
HGB BLD-MCNC: 12.6 G/DL — SIGNIFICANT CHANGE UP (ref 11.5–15.5)
IMM GRANULOCYTES NFR BLD AUTO: 0.2 % — SIGNIFICANT CHANGE UP (ref 0–1.5)
KETONES UR-MCNC: ABNORMAL
LEUKOCYTE ESTERASE UR-ACNC: NEGATIVE — SIGNIFICANT CHANGE UP
LYMPHOCYTES # BLD AUTO: 2.15 K/UL — SIGNIFICANT CHANGE UP (ref 1–3.3)
LYMPHOCYTES # BLD AUTO: 23.6 % — SIGNIFICANT CHANGE UP (ref 13–44)
MAGNESIUM SERPL-MCNC: 2 MG/DL — SIGNIFICANT CHANGE UP (ref 1.6–2.6)
MCHC RBC-ENTMCNC: 30.4 PG — SIGNIFICANT CHANGE UP (ref 27–34)
MCHC RBC-ENTMCNC: 34 GM/DL — SIGNIFICANT CHANGE UP (ref 32–36)
MCV RBC AUTO: 89.4 FL — SIGNIFICANT CHANGE UP (ref 80–100)
MONOCYTES # BLD AUTO: 0.69 K/UL — SIGNIFICANT CHANGE UP (ref 0–0.9)
MONOCYTES NFR BLD AUTO: 7.6 % — SIGNIFICANT CHANGE UP (ref 2–14)
NEUTROPHILS # BLD AUTO: 6.08 K/UL — SIGNIFICANT CHANGE UP (ref 1.8–7.4)
NEUTROPHILS NFR BLD AUTO: 66.9 % — SIGNIFICANT CHANGE UP (ref 43–77)
NITRITE UR-MCNC: NEGATIVE — SIGNIFICANT CHANGE UP
NRBC # BLD: 0 /100 WBCS — SIGNIFICANT CHANGE UP (ref 0–0)
OSMOLALITY UR: 421 MOS/KG — SIGNIFICANT CHANGE UP (ref 300–900)
PH UR: 6.5 — SIGNIFICANT CHANGE UP (ref 5–8)
PHOSPHATE SERPL-MCNC: 2.9 MG/DL — SIGNIFICANT CHANGE UP (ref 2.5–4.5)
PLATELET # BLD AUTO: 259 K/UL — SIGNIFICANT CHANGE UP (ref 150–400)
POTASSIUM SERPL-MCNC: 3.9 MMOL/L — SIGNIFICANT CHANGE UP (ref 3.5–5.3)
POTASSIUM SERPL-MCNC: 4.3 MMOL/L — SIGNIFICANT CHANGE UP (ref 3.5–5.3)
POTASSIUM SERPL-SCNC: 3.9 MMOL/L — SIGNIFICANT CHANGE UP (ref 3.5–5.3)
POTASSIUM SERPL-SCNC: 4.3 MMOL/L — SIGNIFICANT CHANGE UP (ref 3.5–5.3)
PROT SERPL-MCNC: 7.2 G/DL — SIGNIFICANT CHANGE UP (ref 6–8.3)
PROT UR-MCNC: SIGNIFICANT CHANGE UP
RBC # BLD: 4.15 M/UL — SIGNIFICANT CHANGE UP (ref 3.8–5.2)
RBC # FLD: 13.2 % — SIGNIFICANT CHANGE UP (ref 10.3–14.5)
RBC CASTS # UR COMP ASSIST: 1 /HPF — SIGNIFICANT CHANGE UP (ref 0–4)
SARS-COV-2 RNA SPEC QL NAA+PROBE: SIGNIFICANT CHANGE UP
SODIUM SERPL-SCNC: 124 MMOL/L — LOW (ref 135–145)
SODIUM SERPL-SCNC: 127 MMOL/L — LOW (ref 135–145)
SODIUM UR-SCNC: 69 MMOL/L — SIGNIFICANT CHANGE UP
SP GR SPEC: 1.02 — SIGNIFICANT CHANGE UP (ref 1.01–1.02)
TSH SERPL-MCNC: 6.5 UIU/ML — HIGH (ref 0.27–4.2)
URATE UR-MCNC: 28.3 MG/DL — SIGNIFICANT CHANGE UP
UROBILINOGEN FLD QL: NEGATIVE — SIGNIFICANT CHANGE UP
WBC # BLD: 9.1 K/UL — SIGNIFICANT CHANGE UP (ref 3.8–10.5)
WBC # FLD AUTO: 9.1 K/UL — SIGNIFICANT CHANGE UP (ref 3.8–10.5)
WBC UR QL: 0 /HPF — SIGNIFICANT CHANGE UP (ref 0–5)

## 2021-06-09 PROCEDURE — 99221 1ST HOSP IP/OBS SF/LOW 40: CPT | Mod: GC

## 2021-06-09 PROCEDURE — 71045 X-RAY EXAM CHEST 1 VIEW: CPT | Mod: 26

## 2021-06-09 PROCEDURE — 95720 EEG PHY/QHP EA INCR W/VEEG: CPT

## 2021-06-09 RX ORDER — LEVETIRACETAM 250 MG/1
2000 TABLET, FILM COATED ORAL EVERY 24 HOURS
Refills: 0 | Status: DISCONTINUED | OUTPATIENT
Start: 2021-06-09 | End: 2021-06-09

## 2021-06-09 RX ORDER — LEVETIRACETAM 250 MG/1
500 TABLET, FILM COATED ORAL
Refills: 0 | Status: COMPLETED | OUTPATIENT
Start: 2021-06-09 | End: 2021-06-10

## 2021-06-09 RX ORDER — LEVETIRACETAM 250 MG/1
1750 TABLET, FILM COATED ORAL ONCE
Refills: 0 | Status: COMPLETED | OUTPATIENT
Start: 2021-06-09 | End: 2021-06-09

## 2021-06-09 RX ORDER — ASCORBIC ACID 60 MG
1 TABLET,CHEWABLE ORAL
Qty: 0 | Refills: 0 | DISCHARGE

## 2021-06-09 RX ORDER — SODIUM CHLORIDE 9 MG/ML
2 INJECTION INTRAMUSCULAR; INTRAVENOUS; SUBCUTANEOUS
Refills: 0 | Status: DISCONTINUED | OUTPATIENT
Start: 2021-06-09 | End: 2021-06-10

## 2021-06-09 RX ORDER — HEPARIN SODIUM 5000 [USP'U]/ML
5000 INJECTION INTRAVENOUS; SUBCUTANEOUS EVERY 12 HOURS
Refills: 0 | Status: DISCONTINUED | OUTPATIENT
Start: 2021-06-09 | End: 2021-06-12

## 2021-06-09 RX ORDER — LEVETIRACETAM 250 MG/1
1500 TABLET, FILM COATED ORAL ONCE
Refills: 0 | Status: DISCONTINUED | OUTPATIENT
Start: 2021-06-09 | End: 2021-06-10

## 2021-06-09 RX ORDER — SODIUM CHLORIDE 9 MG/ML
1000 INJECTION INTRAMUSCULAR; INTRAVENOUS; SUBCUTANEOUS
Refills: 0 | Status: DISCONTINUED | OUTPATIENT
Start: 2021-06-09 | End: 2021-06-09

## 2021-06-09 RX ORDER — LEVETIRACETAM 250 MG/1
1750 TABLET, FILM COATED ORAL
Refills: 0 | Status: DISCONTINUED | OUTPATIENT
Start: 2021-06-09 | End: 2021-06-09

## 2021-06-09 RX ORDER — LEVOTHYROXINE SODIUM 125 MCG
50 TABLET ORAL DAILY
Refills: 0 | Status: DISCONTINUED | OUTPATIENT
Start: 2021-06-09 | End: 2021-06-12

## 2021-06-09 RX ORDER — LEVOTHYROXINE SODIUM 125 MCG
1 TABLET ORAL
Qty: 0 | Refills: 0 | DISCHARGE

## 2021-06-09 RX ORDER — LEVOTHYROXINE SODIUM 125 MCG
0 TABLET ORAL
Qty: 0 | Refills: 0 | DISCHARGE

## 2021-06-09 RX ORDER — LEVETIRACETAM 250 MG/1
750 TABLET, FILM COATED ORAL EVERY 24 HOURS
Refills: 0 | Status: DISCONTINUED | OUTPATIENT
Start: 2021-06-09 | End: 2021-06-09

## 2021-06-09 RX ORDER — ACETAMINOPHEN 500 MG
650 TABLET ORAL EVERY 6 HOURS
Refills: 0 | Status: DISCONTINUED | OUTPATIENT
Start: 2021-06-09 | End: 2021-06-12

## 2021-06-09 RX ADMIN — Medication 30 MILLIGRAM(S): at 20:40

## 2021-06-09 RX ADMIN — SODIUM CHLORIDE 2 GRAM(S): 9 INJECTION INTRAMUSCULAR; INTRAVENOUS; SUBCUTANEOUS at 17:37

## 2021-06-09 RX ADMIN — Medication 1 TABLET(S): at 13:10

## 2021-06-09 RX ADMIN — SODIUM CHLORIDE 75 MILLILITER(S): 9 INJECTION INTRAMUSCULAR; INTRAVENOUS; SUBCUTANEOUS at 00:34

## 2021-06-09 RX ADMIN — LEVETIRACETAM 1750 MILLIGRAM(S): 250 TABLET, FILM COATED ORAL at 08:46

## 2021-06-09 RX ADMIN — HEPARIN SODIUM 5000 UNIT(S): 5000 INJECTION INTRAVENOUS; SUBCUTANEOUS at 17:37

## 2021-06-09 RX ADMIN — LEVETIRACETAM 500 MILLIGRAM(S): 250 TABLET, FILM COATED ORAL at 19:33

## 2021-06-09 NOTE — H&P ADULT - NSHPREVIEWOFSYSTEMS_GEN_ALL_CORE
REVIEW OF SYSTEMS:    CONSTITUTIONAL: No weakness, fevers or chills  EYES/ENT: No visual changes;  No vertigo or throat pain   NECK: No pain or stiffness  RESPIRATORY: No cough, wheezing, hemoptysis; No shortness of breath  CARDIOVASCULAR: No chest pain or palpitations  GASTROINTESTINAL: No abdominal or epigastric pain. No nausea, vomiting, or hematemesis; No diarrhea or constipation. No melena or hematochezia.  GENITOURINARY: No dysuria, frequency or hematuria  NEUROLOGICAL: No numbness or weakness anxious  SKIN: No itching, burning, rashes, or lesions   All other review of systems is negative unless indicated above.

## 2021-06-09 NOTE — CONSULT NOTE ADULT - SUBJECTIVE AND OBJECTIVE BOX
[facility] - RENETTA Henson / RENETTA Santos / OJ Rose/ RENETTA Staley/ RENETTA Michael/ FOX Sanchez / CHU Crum / JOSUE Abrams  -------------------------------------------------------------------------------------------------------  The patient seen and examined today.  HPI:  51F hx seizures, depression p/w hyponatremia. Pt w/ last seizure in mid-may, started on oxcarbazepine BID. Noted on routine labwork y/d to be hyponatremic to 127. States asymptomatic otherwise. No fever, n/v/d/c, chest / abd pain, cough, sob, dizziness, dysuria/hematuria. (2021 09:33)      PAST MEDICAL & SURGICAL HISTORY:  History of seizure    Depression    Hypothyroid    No significant past surgical history      Allergies :- lamotrigine (Rash)    Home Medications Reviewed  Hospital Medications:   MEDICATIONS  (STANDING):  heparin   Injectable 5000 Unit(s) SubCutaneous every 12 hours  levETIRAcetam 750 milliGRAM(s) Oral every 24 hours  levETIRAcetam 2000 milliGRAM(s) Oral every 24 hours  levothyroxine 50 MICROGram(s) Oral daily  PARoxetine 30 milliGRAM(s) Oral daily  sodium chloride 0.9%. 1000 milliLiter(s) (75 mL/Hr) IV Continuous <Continuous>    SOCIAL HISTORY:  Denies ETOh,Smoking,   FAMILY HISTORY:      REVIEW OF SYSTEMS:  CONSTITUTIONAL: No weakness, fevers or chills  EYES/ENT: No visual changes;  No vertigo or throat pain   NECK: No pain or stiffness  RESPIRATORY: No cough, wheezing, hemoptysis; No shortness of breath  CARDIOVASCULAR: No chest pain or palpitations.  GASTROINTESTINAL: No abdominal or epigastric pain. No nausea, vomiting, or hematemesis; No diarrhea or constipation. No melena or hematochezia.  GENITOURINARY: No dysuria, frequency, foamy urine, urinary urgency, incontinence or hematuria  NEUROLOGICAL: No numbness or weakness  SKIN: No itching, burning, rashes, or lesions   VASCULAR: No bilateral lower extremity edema.   All other review of systems is negative unless indicated above.    VITALS:  T(F): 98.2 (21 @ 08:00), Max: 99 (21 @ 22:34)  HR: 67 (21 @ 08:00)  BP: 129/81 (21 @ 08:00)  RR: 18 (21 @ 08:00)  SpO2: 97% (21 @ 08:00)  Wt(kg): --     @ 07:01  -   07:00  --------------------------------------------------------  IN: 150 mL / OUT: 0 mL / NET: 150 mL      Height (cm): 162.6 ( 22:34)  Weight (kg): 68 (:34)  BMI (kg/m2): 25.7 ( 22:34)  BSA (m2): 1.73 (:34)    PHYSICAL EXAM:  Constitutional: NAD  HEENT: anicteric sclera, oropharynx clear, MMM  Neck: supple.   Respiratory: Bilateral equal breath sounds , no wheezes, no crackles  Cardiovascular: S1, S2, Regular, Murmur present.  Gastrointestinal: Bowel Sound present, soft, NT/ND  Extremities: No cyanosis or clubbing. No peripheral edema  Neurological: Alert and oriented x 3, no focal deficits  Psychiatric: Normal mood, normal affect  : No CVA tenderness. No farah.   Skin: No rashes    Data:      127<L>  |  93<L>  |  10  ----------------------------<  88  4.3   |  23  |  0.60    Ca    8.9      2021 07:03  Phos  2.9     06-08  Mg     2.0     06-08    TPro  7.2  /  Alb  4.2  /  TBili  0.2  /  DBili      /  AST  19  /  ALT  34  /  AlkPhos  81  -    Creatinine Trend: 0.60 <--, 0.66 <--                        12.6   9.10  )-----------( 259      ( 2021 23:52 )             37.1     Urine Studies:  Urinalysis Basic - ( 2021 23:58 )    Color: Light Yellow / Appearance: Clear / S.016 / pH:   Gluc:  / Ketone: Small  / Bili: Negative / Urobili: Negative   Blood:  / Protein: Trace / Nitrite: Negative   Leuk Esterase: Negative / RBC: 1 /hpf / WBC 0 /HPF   Sq Epi:  / Non Sq Epi: 2 /hpf / Bacteria: Negative      Sodium, Random Urine: 69 mmol/L ( @ 23:58)  Osmolality, Random Urine: 421 mos/kg ( @ 23:58)  Creatinine, Random Urine: 54 mg/dL ( @ 23:58)  Chloride, Random Urine: 58 mmol/L ( @ 23:58)

## 2021-06-09 NOTE — H&P ADULT - NSHPSOCIALHISTORY_GEN_ALL_CORE
Social History:    Marital Status:  (   )    ( x  ) Single    (   )    (  )   Occupation:   Lives with: (  ) alone  (  ) children   (  ) spouse   (  ) parents  (x  ) other    Substance Use (street drugs): (  x) never used  (  ) other:  Tobacco Usage:  ( x  ) never smoked   (   ) former smoker   (   ) current smoker  (     ) pack years  (        ) last cigarette date  Alcohol Usage: denies    (     ) Advanced Directives: (     ) None    (      ) DNR    (     ) DNI    (     ) Health Care Proxy:

## 2021-06-09 NOTE — PATIENT PROFILE ADULT - NSPROSPHOSPCHAPLAINYN_GEN_A_NUR
[birth year]/27[birth year]2017  Mary Carmichael is a 46 y.o., female.    [facility] Anesthesia Evaluation    I have reviewed the Patient Summary Reports.     I have reviewed the Medications.     Review of Systems  Anesthesia Hx:  No problems with previous Anesthesia    Cardiovascular:   Hypertension CAD      Pulmonary:   Denies Shortness of breath.  Denies Recent URI.    Hepatic/GI:   Liver Disease, Hepatitis    Endocrine:   Diabetes    Psych:   anxiety          Physical Exam  General:  Well nourished, Obesity    Airway/Jaw/Neck:  Airway Findings: Mouth Opening: Normal Tongue: Normal  General Airway Assessment: Adult  Mallampati: II  Jaw/Neck Findings:  Neck ROM: Normal ROM      Dental:  Dental Findings: In tact   Chest/Lungs:  Chest/Lungs Findings: Clear to auscultation, Normal Respiratory Rate     Heart/Vascular:  Heart Findings: Rate: Normal  Rhythm: Regular Rhythm  Sounds: Normal        Mental Status:  Mental Status Findings:  Cooperative, Alert and Oriented         Anesthesia Plan  Type of Anesthesia, risks & benefits discussed:  Anesthesia Type:  general  Patient's Preference:   Intra-op Monitoring Plan:   Intra-op Monitoring Plan Comments:   Post Op Pain Control Plan:   Post Op Pain Control Plan Comments:   Induction:   IV  Beta Blocker:  Patient is on a Beta-Blocker and has not received dose within the past 24 hours due to non-compliance or for other reasons (Patient should receive a perioperative dose or document why it is withheld).       Informed Consent: Patient understands risks and agrees with Anesthesia plan.  Questions answered. Anesthesia consent signed with patient.  ASA Score: 2     Day of Surgery Review of History & Physical:    H&P update referred to the provider.         Ready For Surgery From Anesthesia Perspective.        no

## 2021-06-09 NOTE — CONSULT NOTE ADULT - SUBJECTIVE AND OBJECTIVE BOX
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 At baseline, the patient is ambulatory without assistive devices and is oriented to all modalities. Patient had her last seizure in May of 2021 secondary to sleep deprivation. She is currently on Keppra 1750mg AM and 2000mg PM and Trileptal 450mg bid. She was discharged from the  with Vimpat but was unable to obtain prior authorization and thus was started on Trileptal instead. She went to her primary care office to obtain routine bloodwork and was found to be hyponatremic and advised to present to [facility] for further evaluation and treatment. She currently states that she feels well and denies any headaches, dizziness, lightheadedness, numbness, tingling, weakness, gait instability, visual abnormalities, dysphagia, dysarthria. Denies any recent illnesses or sick contacts. Received her COVID vaccine in 2021. States that she is adherent to her medications and has not missed any doses. States that she doesn't always sleep well. Her mood has been "ok". Denies any alcohol or illicit substances.     Seizure Hx: Patient had her first seizure at age 20 and had a second seizure several months later and was started on valproate at that time. She remained seizure free for several years and then was off medications due to pregnancy. After the birth of her child due to sleep deprivation she experienced another seizure and was restarted on valproate. She self discontinued her medications afterwards and remained seizure free for 25 years until a breakthrough event in 2019. She was started on Keppra which was titrated up to her current dose of 1750mg AM and 2000mg PM. She has been having intermittent events with her most recent event occurring in May 2021 which was felt to be due to sleep deprivation. Briefly was on Vimpat but was unable to obtain prior authorization and was instead started on oxcarbazepine 450mg bid. Has not had any seizures since.     Auras: does state she occasionally has steven vu prior to her seizure onset    Previous medications: valproate (self discontinued due to not wanting to take large pills), lamotrigine (discontinued due to rash), oxcarbazepine, Keppra    Semiology: generalized motor convulsions with impaired awareness with associated tongue bite, urinary incontinence, post-ictal confusion. Events of unknown duration    Studies: EEG 2021 reported as normal without seizures or epileptiform discharges. MRI 2021 reported as unremarkable and normal mesial temporal lobes      REVIEW OF SYSTEMS    A 10-system ROS was performed and is negative except for those items noted above and/or in the HPI.    PAST MEDICAL & SURGICAL HISTORY:  History of seizure    Depression    Hypothyroid    No significant past surgical history      FAMILY HISTORY:    SOCIAL HISTORY:   T/E/D:   Occupation:   Lives with:     MEDICATIONS (HOME):  Home Medications:  Daily Vahid oral tablet: 1 tab(s) orally once a day (2021 09:50)  levETIRAcetam: 750mg in the morning (1.5 tab of 500mg) and 2,000mg (2 tabs of 1,000mg) in the evening  (2021 09:50)  levothyroxine 50 mcg (0.05 mg) oral tablet: 1 tab(s) orally once a day (2021 09:50)  OXcarbazepine 300 mg oral tablet: 1.5 tab(s) (450mg) orally 2 times a day    Note:Pt was to hold medication. (2021 09:50)  PARoxetine 30 mg oral tablet: 1 tab(s) orally once a day (2021 09:50)  Vitamin C: 1 tab(s) orally prn (2021 09:50)    MEDICATIONS  (STANDING):  heparin   Injectable 5000 Unit(s) SubCutaneous every 12 hours  levETIRAcetam 750 milliGRAM(s) Oral every 24 hours  levETIRAcetam 2000 milliGRAM(s) Oral every 24 hours  levothyroxine 50 MICROGram(s) Oral daily  PARoxetine 30 milliGRAM(s) Oral daily  sodium chloride 2 Gram(s) Oral two times a day    MEDICATIONS  (PRN):  acetaminophen   Tablet .. 650 milliGRAM(s) Oral every 6 hours PRN Temp greater or equal to 38.5C (101.3F), Mild Pain (1 - 3)    ALLERGIES/INTOLERANCES:  Allergies  lamotrigine (Rash)    Intolerances    VITALS & EXAMINATION:  Vital Signs Last 24 Hrs  T(C): 36.7 (2021 10:08), Max: 37.2 (2021 22:34)  T(F): 98 (2021 10:08), Max: 99 (2021 22:34)  HR: 77 (2021 10:08) (67 - 78)  BP: 131/79 (2021 10:08) (109/67 - 150/82)  BP(mean): --  RR: 18 (2021 10:08) (16 - 19)  SpO2: 97% (2021 10:08) (97% - 98%)    General:  Constitutional: Appears stated age, in no apparent distress including pain  Head: Normocephalic & atraumatic.    Neurological (>12):  MS: Awake, alert, oriented to person, place, situation, time. Normal affect. Follows all commands.    Language: Speech is clear, fluent with good repetition & comprehension.    CNs: PERRLA (R = 3mm, L = 3mm). VF intact in all 4 quadrants. EOMI no nystagmus. V1-3 intact to LT. No facial asymmetry b/l. Symmetric palate elevation in midline. Shoulder shrug intact b/l. Tongue midline, normal movements, no atrophy.    Motor: Normal muscle bulk & tone. No noticeable tremor or seizure. No pronator drift.              Deltoid	Biceps	Triceps	   R	5	5	5	5		  L	5	5	5	5	    	H-Flex	H-Ext	K-Flex	K-Ext	D-Flex	P-Flex  R	5	5	5	5	5	5	   L	5	5	5	5	5	5	     Sensation: Intact to LT throughout.     Reflexes:              Biceps(C5)       BR(C6)     Triceps(C7)               Patellar(L4)    Achilles(S1)    Plantar Resp  R	2	          2	             2		        2		    2		Down   L	2	          2	             2		        2		    2		Down     Coordination: No dysmetria to FTN    Gait: Normal gait.     LABORATORY:  CBC                       12.6   9.10  )-----------( 259      ( 2021 23:52 )             37.1     Chem 06-09    127<L>  |  93<L>  |  10  ----------------------------<  88  4.3   |  23  |  0.60    Ca    8.9      2021 07:03  Phos  2.9     06-08  Mg     2.0     06-08    TPro  7.2  /  Alb  4.2  /  TBili  0.2  /  DBili  x   /  AST  19  /  ALT  34  /  AlkPhos  81  06-08    LFTs LIVER FUNCTIONS - ( 2021 23:52 )  Alb: 4.2 g/dL / Pro: 7.2 g/dL / ALK PHOS: 81 U/L / ALT: 34 U/L / AST: 19 U/L / GGT: x           Coagulopathy   Lipid Panel   A1c   Cardiac enzymes     U/A Urinalysis Basic - ( 2021 23:58 )    Color: Light Yellow / Appearance: Clear / S.016 / pH: x  Gluc: x / Ketone: Small  / Bili: Negative / Urobili: Negative   Blood: x / Protein: Trace / Nitrite: Negative   Leuk Esterase: Negative / RBC: 1 /hpf / WBC 0 /HPF   Sq Epi: x / Non Sq Epi: 2 /hpf / Bacteria: Negative    STUDIES & IMAGING:    Radiology (XR, CT, MR, U/S, TTE/WHITNEY):     HPI:  Lexi Starr is a 51 year old female with a past medical history of 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 At baseline, the patient is ambulatory without assistive devices and is oriented to all modalities. Patient had her last seizure in May of 2021 secondary to sleep deprivation. She is currently on Keppra 1750mg AM and 2000mg PM and Trileptal 450mg bid. She was discharged from the  with Vimpat but was unable to obtain prior authorization and thus was started on Trileptal instead. She went to her primary care office to obtain routine bloodwork and was found to be hyponatremic and advised to present to [facility] for further evaluation and treatment. She currently states that she feels well and denies any headaches, dizziness, lightheadedness, numbness, tingling, weakness, gait instability, visual abnormalities, dysphagia, dysarthria. Denies any recent illnesses or sick contacts. Received her COVID vaccine in 2021. States that she is adherent to her medications and has not missed any doses. States that she doesn't always sleep well. Her mood has been "ok". Denies any alcohol or illicit substances.   Patient follows with outpatient neurologist, Dr. Neptali Hodge.    Seizure Hx: Patient had her first seizure at age 20 and had a second seizure several months later and was started on valproate at that time. She remained seizure free for several years and then was off medications due to pregnancy. After the birth of her child due to sleep deprivation she experienced another seizure and was restarted on valproate. She self discontinued her medications afterwards and remained seizure free for 25 years until a breakthrough event in 2019. She was started on Keppra which was titrated up to her current dose of 1750mg AM and 2000mg PM. She has been having intermittent events with her most recent event occurring in May 2021 which was felt to be due to sleep deprivation. Briefly was on Vimpat but was unable to obtain prior authorization and was instead started on oxcarbazepine 450mg bid. Has not had any seizures since.     Auras: does state she occasionally has steven vu prior to her seizure onset    Previous medications: valproate (self discontinued due to not wanting to take large pills), lamotrigine (discontinued due to rash), oxcarbazepine, Keppra    Semiology: generalized motor convulsions with impaired awareness with associated tongue bite, urinary incontinence, post-ictal confusion. Events of unknown duration    Studies: EEG 2021 reported as normal without seizures or epileptiform discharges. MRI 2021 reported as unremarkable and normal mesial temporal lobes      REVIEW OF SYSTEMS    A 10-system ROS was performed and is negative except for those items noted above and/or in the HPI.    PAST MEDICAL & SURGICAL HISTORY:  History of seizure    Depression    Hypothyroid    No significant past surgical history      FAMILY HISTORY:    SOCIAL HISTORY:   T/E/D:   Occupation:   Lives with:     MEDICATIONS (HOME):  Home Medications:  Daily Vahid oral tablet: 1 tab(s) orally once a day (2021 09:50)  levETIRAcetam: 750mg in the morning (1.5 tab of 500mg) and 2,000mg (2 tabs of 1,000mg) in the evening  (2021 09:50)  levothyroxine 50 mcg (0.05 mg) oral tablet: 1 tab(s) orally once a day (2021 09:50)  OXcarbazepine 300 mg oral tablet: 1.5 tab(s) (450mg) orally 2 times a day    Note:Pt was to hold medication. (2021 09:50)  PARoxetine 30 mg oral tablet: 1 tab(s) orally once a day (2021 09:50)  Vitamin C: 1 tab(s) orally prn (2021 09:50)    MEDICATIONS  (STANDING):  heparin   Injectable 5000 Unit(s) SubCutaneous every 12 hours  levETIRAcetam 750 milliGRAM(s) Oral every 24 hours  levETIRAcetam 2000 milliGRAM(s) Oral every 24 hours  levothyroxine 50 MICROGram(s) Oral daily  PARoxetine 30 milliGRAM(s) Oral daily  sodium chloride 2 Gram(s) Oral two times a day    MEDICATIONS  (PRN):  acetaminophen   Tablet .. 650 milliGRAM(s) Oral every 6 hours PRN Temp greater or equal to 38.5C (101.3F), Mild Pain (1 - 3)    ALLERGIES/INTOLERANCES:  Allergies  lamotrigine (Rash)    Intolerances    VITALS & EXAMINATION:  Vital Signs Last 24 Hrs  T(C): 36.7 (2021 10:08), Max: 37.2 (2021 22:34)  T(F): 98 (2021 10:08), Max: 99 (2021 22:34)  HR: 77 (2021 10:08) (67 - 78)  BP: 131/79 (2021 10:08) (109/67 - 150/82)  BP(mean): --  RR: 18 (2021 10:08) (16 - 19)  SpO2: 97% (2021 10:08) (97% - 98%)    General:  Constitutional: Appears stated age, in no apparent distress including pain  Head: Normocephalic & atraumatic.    Neurological (>12):  MS: Awake, alert, oriented to person, place, situation, time. Normal affect. Follows all commands.    Language: Speech is clear, fluent with good repetition & comprehension.    CNs: PERRLA (R = 3mm, L = 3mm). VF intact in all 4 quadrants. EOMI no nystagmus. V1-3 intact to LT. No facial asymmetry b/l. Symmetric palate elevation in midline. Shoulder shrug intact b/l. Tongue midline, normal movements, no atrophy.    Motor: Normal muscle bulk & tone. No noticeable tremor or seizure. No pronator drift.              Deltoid	Biceps	Triceps	   R	5	5	5	5		  L	5	5	5	5	    	H-Flex	H-Ext	K-Flex	K-Ext	D-Flex	P-Flex  R	5	5	5	5	5	5	   L	5	5	5	5	5	5	     Sensation: Intact to LT throughout.     Reflexes:              Biceps(C5)       BR(C6)     Triceps(C7)               Patellar(L4)    Achilles(S1)    Plantar Resp  R	2	          2	             2		        2		    2		Down   L	2	          2	             2		        2		    2		Down     Coordination: No dysmetria to FTN    Gait: Normal gait.     LABORATORY:  CBC                       12.6   9.10  )-----------( 259      ( 2021 23:52 )             37.1     Chem 06-09    127<L>  |  93<L>  |  10  ----------------------------<  88  4.3   |  23  |  0.60    Ca    8.9      2021 07:03  Phos  2.9     06-08  Mg     2.0     06-08    TPro  7.2  /  Alb  4.2  /  TBili  0.2  /  DBili  x   /  AST  19  /  ALT  34  /  AlkPhos  81  06-08    LFTs LIVER FUNCTIONS - ( 2021 23:52 )  Alb: 4.2 g/dL / Pro: 7.2 g/dL / ALK PHOS: 81 U/L / ALT: 34 U/L / AST: 19 U/L / GGT: x           Coagulopathy   Lipid Panel   A1c   Cardiac enzymes     U/A Urinalysis Basic - ( 2021 23:58 )    Color: Light Yellow / Appearance: Clear / S.016 / pH: x  Gluc: x / Ketone: Small  / Bili: Negative / Urobili: Negative   Blood: x / Protein: Trace / Nitrite: Negative   Leuk Esterase: Negative / RBC: 1 /hpf / WBC 0 /HPF   Sq Epi: x / Non Sq Epi: 2 /hpf / Bacteria: Negative    STUDIES & IMAGING:    Radiology (XR, CT, MR, U/S, TTE/WHITNEY):     HPI:  Lexi Starr is a 51 year old female with a past medical history of 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 At baseline, the patient is ambulatory without assistive devices and is oriented to all modalities. Patient had her last seizure in May of 2021 secondary to sleep deprivation. She is currently on Keppra 1750mg AM and 2000mg PM and Trileptal 450mg bid. She was discharged from the  with Vimpat but was unable to obtain prior authorization and thus was started on Trileptal instead. She went to her primary care office to obtain routine bloodwork and was found to be hyponatremic and advised to present to [facility] for further evaluation and treatment. She currently states that she feels well and denies any headaches, dizziness, lightheadedness, numbness, tingling, weakness, gait instability, visual abnormalities, dysphagia, dysarthria. Denies any recent illnesses or sick contacts. Received her COVID vaccine in 2021. States that she is adherent to her medications and has not missed any doses. States that she doesn't always sleep well. Her mood has been "ok". Denies any alcohol or illicit substances.   Patient follows with outpatient neurologist, Dr. Neptali Hodge.    Seizure Hx: Patient had her first seizure at age 20 and had a second seizure several months later and was started on valproate at that time. She remained seizure free for several years and then was off medications due to pregnancy. After the birth of her child due to sleep deprivation she experienced another seizure and was restarted on valproate. She self discontinued her medications afterwards and remained seizure free for 25 years until a breakthrough event in 2019. She was started on Keppra which was titrated up to her current dose of 1750mg AM and 2000mg PM. She has been having intermittent events with her most recent event occurring in May 2021 which was felt to be due to sleep deprivation. Briefly was on Vimpat but was unable to obtain prior authorization and was instead started on oxcarbazepine 450mg bid. Has not had any seizures since.     Auras: does state she occasionally has steven vu prior to her seizure onset    Previous medications: valproate (self discontinued due to not wanting to take large pills), lamotrigine (discontinued due to rash), oxcarbazepine, Keppra    Semiology: generalized motor convulsions with impaired awareness with associated tongue bite, urinary incontinence, post-ictal confusion. Events of unknown duration    Studies: EEG 2020 reported as normal without seizures or epileptiform discharges. MRI 2021 reported as unremarkable and normal mesial temporal lobes      REVIEW OF SYSTEMS    A 10-system ROS was performed and is negative except for those items noted above and/or in the HPI.    PAST MEDICAL & SURGICAL HISTORY:  History of seizure    Depression    Hypothyroid    No significant past surgical history      FAMILY HISTORY:    SOCIAL HISTORY:   T/E/D:   Occupation:   Lives with:     MEDICATIONS (HOME):  Home Medications:  Daily Vahid oral tablet: 1 tab(s) orally once a day (2021 09:50)  levETIRAcetam: 750mg in the morning (1.5 tab of 500mg) and 2,000mg (2 tabs of 1,000mg) in the evening  (2021 09:50)  levothyroxine 50 mcg (0.05 mg) oral tablet: 1 tab(s) orally once a day (2021 09:50)  OXcarbazepine 300 mg oral tablet: 1.5 tab(s) (450mg) orally 2 times a day    Note:Pt was to hold medication. (2021 09:50)  PARoxetine 30 mg oral tablet: 1 tab(s) orally once a day (2021 09:50)  Vitamin C: 1 tab(s) orally prn (2021 09:50)    MEDICATIONS  (STANDING):  heparin   Injectable 5000 Unit(s) SubCutaneous every 12 hours  levETIRAcetam 750 milliGRAM(s) Oral every 24 hours  levETIRAcetam 2000 milliGRAM(s) Oral every 24 hours  levothyroxine 50 MICROGram(s) Oral daily  PARoxetine 30 milliGRAM(s) Oral daily  sodium chloride 2 Gram(s) Oral two times a day    MEDICATIONS  (PRN):  acetaminophen   Tablet .. 650 milliGRAM(s) Oral every 6 hours PRN Temp greater or equal to 38.5C (101.3F), Mild Pain (1 - 3)    ALLERGIES/INTOLERANCES:  Allergies  lamotrigine (Rash)    Intolerances    VITALS & EXAMINATION:  Vital Signs Last 24 Hrs  T(C): 36.7 (2021 10:08), Max: 37.2 (2021 22:34)  T(F): 98 (2021 10:08), Max: 99 (2021 22:34)  HR: 77 (2021 10:08) (67 - 78)  BP: 131/79 (2021 10:08) (109/67 - 150/82)  BP(mean): --  RR: 18 (2021 10:08) (16 - 19)  SpO2: 97% (2021 10:08) (97% - 98%)    General:  Constitutional: Appears stated age, in no apparent distress including pain  Head: Normocephalic & atraumatic.    Neurological (>12):  MS: Awake, alert, oriented to person, place, situation, time. Normal affect. Follows all commands.    Language: Speech is clear, fluent with good repetition & comprehension.    CNs: PERRLA (R = 3mm, L = 3mm). VF intact in all 4 quadrants. EOMI no nystagmus. V1-3 intact to LT. No facial asymmetry b/l. Symmetric palate elevation in midline. Shoulder shrug intact b/l. Tongue midline, normal movements, no atrophy.    Motor: Normal muscle bulk & tone. No noticeable tremor or seizure. No pronator drift.              Deltoid	Biceps	Triceps	   R	5	5	5	5		  L	5	5	5	5	    	H-Flex	H-Ext	K-Flex	K-Ext	D-Flex	P-Flex  R	5	5	5	5	5	5	   L	5	5	5	5	5	5	     Sensation: Intact to LT throughout.     Reflexes:              Biceps(C5)       BR(C6)     Triceps(C7)               Patellar(L4)    Achilles(S1)    Plantar Resp  R	2	          2	             2		        2		    2		Down   L	2	          2	             2		        2		    2		Down     Coordination: No dysmetria to FTN    Gait: Normal gait.     LABORATORY:  CBC                       12.6   9.10  )-----------( 259      ( 2021 23:52 )             37.1     Chem 06-09    127<L>  |  93<L>  |  10  ----------------------------<  88  4.3   |  23  |  0.60    Ca    8.9      2021 07:03  Phos  2.9     06-08  Mg     2.0     06-08    TPro  7.2  /  Alb  4.2  /  TBili  0.2  /  DBili  x   /  AST  19  /  ALT  34  /  AlkPhos  81  06-08    LFTs LIVER FUNCTIONS - ( 2021 23:52 )  Alb: 4.2 g/dL / Pro: 7.2 g/dL / ALK PHOS: 81 U/L / ALT: 34 U/L / AST: 19 U/L / GGT: x           Coagulopathy   Lipid Panel   A1c   Cardiac enzymes     U/A Urinalysis Basic - ( 2021 23:58 )    Color: Light Yellow / Appearance: Clear / S.016 / pH: x  Gluc: x / Ketone: Small  / Bili: Negative / Urobili: Negative   Blood: x / Protein: Trace / Nitrite: Negative   Leuk Esterase: Negative / RBC: 1 /hpf / WBC 0 /HPF   Sq Epi: x / Non Sq Epi: 2 /hpf / Bacteria: Negative    STUDIES & IMAGING:    Radiology (XR, CT, MR, U/S, TTE/WHITNEY):

## 2021-06-09 NOTE — H&P ADULT - NSHPPHYSICALEXAM_GEN_ALL_CORE
PHYSICAL EXAMINATION:  Vital Signs Last 24 Hrs  T(C): 36.8 (09 Jun 2021 08:00), Max: 37.2 (08 Jun 2021 22:34)  T(F): 98.2 (09 Jun 2021 08:00), Max: 99 (08 Jun 2021 22:34)  HR: 67 (09 Jun 2021 08:00) (67 - 78)  BP: 129/81 (09 Jun 2021 08:00) (109/67 - 150/82)  BP(mean): --  RR: 18 (09 Jun 2021 08:00) (16 - 19)  SpO2: 97% (09 Jun 2021 08:00) (97% - 98%)  CAPILLARY BLOOD GLUCOSE          GENERAL: NAD, well-groomed, well-developed  HEAD:  atraumatic, normocephalic  EYES: sclera anicteric  ENMT: mucous membranes moist  NECK: supple, No JVD  CHEST/LUNG: clear to auscultation bilaterally; no rales, rhonchi, or wheezing b/l  HEART: normal S1, S2  ABDOMEN: BS+, soft, ND, NT   EXTREMITIES:  pulses palpable; no clubbing, cyanosis, or edema b/l LEs  NEURO: awake, alert, interactive; moves all extremities  SKIN: no rashes or lesions

## 2021-06-09 NOTE — CONSULT NOTE ADULT - ATTENDING COMMENTS
history as above  epilepsy unclear focal vs generalized ( favor DANK due to clinical presentation and long interval seizure free)  transfer to EMU  hold Keppra and OXC  monitor for 48 hours for interictal   consider alternative AED Vimpat vs Xcopri if focality or LTG if DANK suspected

## 2021-06-09 NOTE — H&P ADULT - ASSESSMENT
51 f with    Hyponatremia  - fluid restrict  - follow Na , lytes  - Nephrology evaluation   - Cxr    Seizures  - continue Rx  - Neurology evaluation    Hypothyroid  - follow TSH    Depression  - continue Rx    DVT prophylaxis    Further action as per clinical course     David Grey MD pager [phone]

## 2021-06-09 NOTE — CONSULT NOTE ADULT - ASSESSMENT
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 Events of unknown duration    Recs:  [] transfer to EMU, accepted by Dr. Ly  [] vEEG  [] continue home Keppra 1750mg AM and Keppra 2000mg PM for now  [] discontinue oxcarbazepine  [] trend CBC, CMP, Mg, P. Obtain CK, ammonia  [] neurochecks q4h  [] DVT PPx with Lovenox 40mg subq daily  [] if patient has a convulsion, please document accurately the length of the episode, and specifically what the patient was doing paying attention to eye opening vs closure, gaze deviation, shaking of extremities, tongue bite, urinary incontinence, any derangement of vital signs  [] If patient has a generalized tonic clonic episode for >3 minutes or significant derangement of vital signs may administer Ativan 2mg IV  [] advise patient to not drive, operate heavy machinery, avoid heights, pools, bathtubs, locked doors.  [] will f/u outpatient upon discharge with epilepsy neurology Dr. Neptali Hodge ([phone])    RESCUE: Ativan 2mg IV and 2nd line Keppra 1500mg IV    Case discussed with EMU attending, Dr. Ly and patient's outpatient neurologist, Dr. Neptali Hodge.   
51F hx seizures, depression p/w hyponatremia. Pt w/ last seizure in mid-may, started on oxcarbazepine BID. Noted on routine labwork y/d to be hyponatremic to 127.    Hyponatremia  Likely related to Oxcarbazepine (up to 10% of cases have hyponatremia)  fluid restriction of 800cc  NaCl 2gr BID  BMP q12hrs for now  stop Oxcarbazepine  Appreciate starting on Levothyroxine        Thank you for allowing me to participate in the care of your patient    For any question, call:  Cell # [phone]  Pager # [phone]  Callback # [phone]

## 2021-06-09 NOTE — H&P ADULT - NSHPLABSRESULTS_GEN_ALL_CORE
12.6   9.10  )-----------( 259      ( 2021 23:52 )             37.1       06-09    127<L>  |  93<L>  |  10  ----------------------------<  88  4.3   |  23  |  0.60    Ca    8.9      2021 07:03  Phos  2.9     06-08  Mg     2.0     06-08    TPro  7.2  /  Alb  4.2  /  TBili  0.2  /  DBili  x   /  AST  19  /  ALT  34  /  AlkPhos  81  06-08              Urinalysis Basic - ( 2021 23:58 )    Color: Light Yellow / Appearance: Clear / S.016 / pH: x  Gluc: x / Ketone: Small  / Bili: Negative / Urobili: Negative   Blood: x / Protein: Trace / Nitrite: Negative   Leuk Esterase: Negative / RBC: 1 /hpf / WBC 0 /HPF   Sq Epi: x / Non Sq Epi: 2 /hpf / Bacteria: Negative

## 2021-06-09 NOTE — ED ADULT NURSE NOTE - OBJECTIVE STATEMENT
51 year old female presents to ED via walk-in complaining of abnormal lab result of low sodium. Pt was started on new seizure medication at the end of May and went to PCP for routine blood work and was found with abnormal lab sent to ED. Upon assessment A&O x4, ambulatory with steady gait and well appearing. Gross neuro intact. Pt is asymptomatic at this time, denies chest pain, SOB, n/v/d, fever, HA, chills. Pt states she feels her normal self. IV placed, blood work and urine sent to lab. Bed locked and lowered. Comfort and safety measures maintained. Comfort and safety measures maintained.

## 2021-06-10 LAB
ALBUMIN SERPL ELPH-MCNC: 4.3 G/DL — SIGNIFICANT CHANGE UP (ref 3.3–5)
ALP SERPL-CCNC: 74 U/L — SIGNIFICANT CHANGE UP (ref 40–120)
ALT FLD-CCNC: 33 U/L — SIGNIFICANT CHANGE UP (ref 10–45)
ANION GAP SERPL CALC-SCNC: 14 MMOL/L — SIGNIFICANT CHANGE UP (ref 5–17)
AST SERPL-CCNC: 21 U/L — SIGNIFICANT CHANGE UP (ref 10–40)
BASOPHILS # BLD AUTO: 0.03 K/UL — SIGNIFICANT CHANGE UP (ref 0–0.2)
BASOPHILS NFR BLD AUTO: 0.4 % — SIGNIFICANT CHANGE UP (ref 0–2)
BILIRUB SERPL-MCNC: 0.2 MG/DL — SIGNIFICANT CHANGE UP (ref 0.2–1.2)
BUN SERPL-MCNC: 12 MG/DL — SIGNIFICANT CHANGE UP (ref 7–23)
CALCIUM SERPL-MCNC: 9.1 MG/DL — SIGNIFICANT CHANGE UP (ref 8.4–10.5)
CHLORIDE SERPL-SCNC: 101 MMOL/L — SIGNIFICANT CHANGE UP (ref 96–108)
CK SERPL-CCNC: 56 U/L — SIGNIFICANT CHANGE UP (ref 25–170)
CO2 SERPL-SCNC: 20 MMOL/L — LOW (ref 22–31)
COVID-19 SPIKE DOMAIN AB INTERP: POSITIVE
COVID-19 SPIKE DOMAIN ANTIBODY RESULT: >250 U/ML — HIGH
CREAT SERPL-MCNC: 0.67 MG/DL — SIGNIFICANT CHANGE UP (ref 0.5–1.3)
EOSINOPHIL # BLD AUTO: 0.18 K/UL — SIGNIFICANT CHANGE UP (ref 0–0.5)
EOSINOPHIL NFR BLD AUTO: 2.5 % — SIGNIFICANT CHANGE UP (ref 0–6)
GLUCOSE SERPL-MCNC: 112 MG/DL — HIGH (ref 70–99)
HCT VFR BLD CALC: 38.7 % — SIGNIFICANT CHANGE UP (ref 34.5–45)
HGB BLD-MCNC: 13 G/DL — SIGNIFICANT CHANGE UP (ref 11.5–15.5)
IMM GRANULOCYTES NFR BLD AUTO: 0.1 % — SIGNIFICANT CHANGE UP (ref 0–1.5)
LYMPHOCYTES # BLD AUTO: 2.51 K/UL — SIGNIFICANT CHANGE UP (ref 1–3.3)
LYMPHOCYTES # BLD AUTO: 34.3 % — SIGNIFICANT CHANGE UP (ref 13–44)
MCHC RBC-ENTMCNC: 30 PG — SIGNIFICANT CHANGE UP (ref 27–34)
MCHC RBC-ENTMCNC: 33.6 GM/DL — SIGNIFICANT CHANGE UP (ref 32–36)
MCV RBC AUTO: 89.4 FL — SIGNIFICANT CHANGE UP (ref 80–100)
MONOCYTES # BLD AUTO: 0.74 K/UL — SIGNIFICANT CHANGE UP (ref 0–0.9)
MONOCYTES NFR BLD AUTO: 10.1 % — SIGNIFICANT CHANGE UP (ref 2–14)
NEUTROPHILS # BLD AUTO: 3.85 K/UL — SIGNIFICANT CHANGE UP (ref 1.8–7.4)
NEUTROPHILS NFR BLD AUTO: 52.6 % — SIGNIFICANT CHANGE UP (ref 43–77)
NRBC # BLD: 0 /100 WBCS — SIGNIFICANT CHANGE UP (ref 0–0)
PLATELET # BLD AUTO: 263 K/UL — SIGNIFICANT CHANGE UP (ref 150–400)
POTASSIUM SERPL-MCNC: 4.3 MMOL/L — SIGNIFICANT CHANGE UP (ref 3.5–5.3)
POTASSIUM SERPL-SCNC: 4.3 MMOL/L — SIGNIFICANT CHANGE UP (ref 3.5–5.3)
PROT SERPL-MCNC: 7.3 G/DL — SIGNIFICANT CHANGE UP (ref 6–8.3)
RBC # BLD: 4.33 M/UL — SIGNIFICANT CHANGE UP (ref 3.8–5.2)
RBC # FLD: 13.7 % — SIGNIFICANT CHANGE UP (ref 10.3–14.5)
SARS-COV-2 IGG+IGM SERPL QL IA: >250 U/ML — HIGH
SARS-COV-2 IGG+IGM SERPL QL IA: POSITIVE
SODIUM SERPL-SCNC: 135 MMOL/L — SIGNIFICANT CHANGE UP (ref 135–145)
TSH SERPL-MCNC: 5.84 UIU/ML — HIGH (ref 0.27–4.2)
WBC # BLD: 7.32 K/UL — SIGNIFICANT CHANGE UP (ref 3.8–10.5)
WBC # FLD AUTO: 7.32 K/UL — SIGNIFICANT CHANGE UP (ref 3.8–10.5)

## 2021-06-10 PROCEDURE — 95720 EEG PHY/QHP EA INCR W/VEEG: CPT

## 2021-06-10 RX ORDER — LACOSAMIDE 50 MG/1
200 TABLET ORAL ONCE
Refills: 0 | Status: DISCONTINUED | OUTPATIENT
Start: 2021-06-10 | End: 2021-06-12

## 2021-06-10 RX ORDER — LACOSAMIDE 50 MG/1
1 TABLET ORAL
Qty: 60 | Refills: 0
Start: 2021-06-10 | End: 2021-07-09

## 2021-06-10 RX ORDER — CENOBAMATE 350 MG/DAY
1 KIT ORAL
Qty: 30 | Refills: 0
Start: 2021-06-10 | End: 2021-07-09

## 2021-06-10 RX ADMIN — Medication 50 MICROGRAM(S): at 05:51

## 2021-06-10 RX ADMIN — LEVETIRACETAM 500 MILLIGRAM(S): 250 TABLET, FILM COATED ORAL at 05:51

## 2021-06-10 RX ADMIN — Medication 30 MILLIGRAM(S): at 20:37

## 2021-06-10 RX ADMIN — Medication 1 TABLET(S): at 11:16

## 2021-06-10 RX ADMIN — HEPARIN SODIUM 5000 UNIT(S): 5000 INJECTION INTRAVENOUS; SUBCUTANEOUS at 05:51

## 2021-06-10 RX ADMIN — HEPARIN SODIUM 5000 UNIT(S): 5000 INJECTION INTRAVENOUS; SUBCUTANEOUS at 17:59

## 2021-06-10 RX ADMIN — SODIUM CHLORIDE 2 GRAM(S): 9 INJECTION INTRAMUSCULAR; INTRAVENOUS; SUBCUTANEOUS at 05:51

## 2021-06-10 NOTE — PROGRESS NOTE ADULT - ASSESSMENT
51 year old woman h/o 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 Events of unknown duration    Recs:  [] transfer to EMU, accepted by Dr. Ly  [] vEEG  [] continue home Keppra 1750mg AM and Keppra 2000mg PM for now  [] discontinue oxcarbazepine  [] trend CBC, CMP, Mg, P. Obtain CK, ammonia  [] neurochecks q4h  [] DVT PPx with Lovenox 40mg subq daily  [] if patient has a convulsion, please document accurately the length of the episode, and specifically what the patient was doing paying attention to eye opening vs closure, gaze deviation, shaking of extremities, tongue bite, urinary incontinence, any derangement of vital signs  [] If patient has a generalized tonic clonic episode for >3 minutes or significant derangement of vital signs may administer Ativan 2mg IV  [] advise patient to not drive, operate heavy machinery, avoid heights, pools, bathtubs, locked doors.  [] will f/u outpatient upon discharge with epilepsy neurology Dr. Neptali Hodge ([phone])    RESCUE: Ativan 2mg IV and 2nd line Keppra 1500mg IV 51 year old woman h/o 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 Events of unknown duration    Recs:  [] c/w vEEG  [] home keppra 1750mg AM | 2000mg PM HELD   [] keppra 500mg bid x 2 doses then STOP  [] discontinued oxcarbaze  [] trend CBC (wnl), CMP (sodium improving), Mg, P, CK (56), ammonia   [] neurochecks q4h  [] DVT PPx with Lovenox 40mg subq daily  [] advise patient to not drive, operate heavy machinery, avoid heights, pools, bathtubs, locked doors.  [] will f/u outpatient upon discharge with epilepsy neurology Dr. Neptali Hodge ([phone])    RESCUE:  ativan 2mg IV for GTC > 3 min or deranagement of vital signs  keppra 1500mg IV if refractory    mgmt to be d/w Epilepsy Dr. Ly

## 2021-06-10 NOTE — DISCHARGE NOTE PROVIDER - NSDCCPCAREPLAN_GEN_ALL_CORE_FT
PRINCIPAL DISCHARGE DIAGNOSIS  Diagnosis: Hyponatremia  Assessment and Plan of Treatment: You were admitted for low sodium seen on routine bloodwork. During your stay in the epilepsy monitoring unit, you were given salt tabs to increase your sodium levels and prevent symptoms including nausea, vomiting, headache, and worsening seizures. Contact your doctor if you experience muscle weakness, confusion, or seizure recurrence, as they may be signs of continued low sodium.      SECONDARY DISCHARGE DIAGNOSES  Diagnosis: Epilepsy  Assessment and Plan of Treatment: During your stay, you underwent EEG monitoring for seizure activity in the brain. Your anti-seizure medications were adjusted to better prevent seizure activity. It is important to continue to take your anti-seizure medications as prescribed and follow up with your outpatient neurologist.     PRINCIPAL DISCHARGE DIAGNOSIS  Diagnosis: Hyponatremia  Assessment and Plan of Treatment: You were admitted for low sodium seen on routine bloodwork. During your stay in the epilepsy monitoring unit, you were given salt tabs to increase your sodium levels and prevent symptoms including nausea, vomiting, headache, and worsening seizures. Contact your doctor if you experience muscle weakness, confusion, or seizure recurrence, as they may be signs of continued low sodium.      SECONDARY DISCHARGE DIAGNOSES  Diagnosis: Epilepsy  Assessment and Plan of Treatment: During your stay, you underwent EEG monitoring for seizure activity in the brain. Your anti-seizure medications were adjusted to better prevent seizure activity. It is important to continue to take your anti-seizure medications as prescribed and follow up with your outpatient neurologist.  Please note your home seizure medications were stopped and you are now on vimpat 200mg twice a day and Xcopri 12.5mg at bedtime (please see instructions for uptitration of dosage).   1.[facility] law mandates you to self-report your seizure disorder to [facility], and be seizure-free for 1yr before you can drive. 2. Avoid swimming, diving, taking a bath, cooking, use of motarized machineries.3. Avoid climbing a ladder, trees or any height.4. Use machines with safety switches.5. Always be aware of your surroundings and make sure family and friends are aware of your seizures.6. Use non-breakable dishes.  7. Consider wearing a medical bracelet to inform people you have epilepsy in case of an emergency

## 2021-06-10 NOTE — PROGRESS NOTE ADULT - SUBJECTIVE AND OBJECTIVE BOX
Patient is a 51y old  Female who presents with a chief complaint of Hyponatremia (2021 10:00)      SUBJECTIVE / OVERNIGHT EVENTS: Comfortable without new complaints. EEG in progress.  Review of Systems  chest pain no  palpitations no  sob no  nausea no  headache no    MEDICATIONS  (STANDING):  heparin   Injectable 5000 Unit(s) SubCutaneous every 12 hours  levothyroxine 50 MICROGram(s) Oral daily  multivitamin 1 Tablet(s) Oral daily  PARoxetine 30 milliGRAM(s) Oral daily    MEDICATIONS  (PRN):  acetaminophen   Tablet .. 650 milliGRAM(s) Oral every 6 hours PRN Temp greater or equal to 38.5C (101.3F), Mild Pain (1 - 3)  lacosamide Injectable 200 milliGRAM(s) IV Push once PRN gtc > 3 mins if not responding to ativan  LORazepam   Injectable 2 milliGRAM(s) IV Push once PRN GTC or focal seizure > 3 min      Vital Signs Last 24 Hrs  T(C): 36.9 (10 Jeevan 2021 12:30), Max: 37.2 (2021 14:57)  T(F): 98.4 (10 Jeevan 2021 12:30), Max: 98.9 (2021 14:57)  HR: 77 (10 Jeevan 2021 12:30) (71 - 97)  BP: 126/83 (10 Jeevan 2021 12:30) (104/58 - 156/81)  BP(mean): --  RR: 18 (10 Jevean 2021 12:30) (18 - 20)  SpO2: 94% (10 Jeevan 2021 12:30) (94% - 97%)    PHYSICAL EXAM:  GENERAL: NAD, well-developed  HEAD:  Atraumatic, Normocephalic  EYES: EOMI, PERRLA, conjunctiva and sclera clear  NECK: Supple, No JVD  CHEST/LUNG: Clear to auscultation bilaterally; No wheeze  HEART: Regular rate and rhythm; No murmurs, rubs, or gallops  ABDOMEN: Soft, Nontender, Nondistended; Bowel sounds present  EXTREMITIES:  2+ Peripheral Pulses, No clubbing, cyanosis, or edema  PSYCH: AAOx3  NEUROLOGY: non-focal  SKIN: No rashes or lesions    LABS:                        13.0   7.32  )-----------( 263      ( 10 Jeevan 2021 06:23 )             38.7     06-10    135  |  101  |  12  ----------------------------<  112<H>  4.3   |  20<L>  |  0.67    Ca    9.1      10 Jeevan 2021 06:23  Phos  2.9     06-08  Mg     2.0     06-08    TPro  7.3  /  Alb  4.3  /  TBili  0.2  /  DBili  x   /  AST  21  /  ALT  33  /  AlkPhos  74  06-10      CARDIAC MARKERS ( 10 Jeevan 2021 06:23 )  x     / x     / 56 U/L / x     / x          Urinalysis Basic - ( 2021 23:58 )    Color: Light Yellow / Appearance: Clear / S.016 / pH: x  Gluc: x / Ketone: Small  / Bili: Negative / Urobili: Negative   Blood: x / Protein: Trace / Nitrite: Negative   Leuk Esterase: Negative / RBC: 1 /hpf / WBC 0 /HPF   Sq Epi: x / Non Sq Epi: 2 /hpf / Bacteria: Negative          RADIOLOGY & ADDITIONAL TESTS:    Imaging Personally Reviewed:    Consultant(s) Notes Reviewed:      Care Discussed with Consultants/Other Providers:

## 2021-06-10 NOTE — DISCHARGE NOTE PROVIDER - NSDCMRMEDTOKEN_GEN_ALL_CORE_FT
Daily Vahid oral tablet: 1 tab(s) orally once a day  levETIRAcetam: 1750mg in the morning  and 2,000mg  in the evening   levothyroxine 50 mcg (0.05 mg) oral tablet: 1 tab(s) orally once a day  OXcarbazepine 300 mg oral tablet: 1.5 tab(s) (450mg) orally 2 times a day    Note:Pt was to hold medication.  PARoxetine 30 mg oral tablet: 1 tab(s) orally once a day  Vimpat 200 mg oral tablet: 1 tab(s) orally 2 times a day MDD:400mg  Vitamin C: 1 tab(s) orally prn  Xcopri Titration Pack 12.5 mg-25 mg oral tablet: 1 tab(s) orally once a day (at bedtime) MDD:25mg   Daily Vahid oral tablet: 1 tab(s) orally once a day  freetext medication    - controlled substance: 12.5 milligram(s) orally once a day (at bedtime)  levothyroxine 50 mcg (0.05 mg) oral tablet: 1 tab(s) orally once a day  PARoxetine 30 mg oral tablet: 1 tab(s) orally once a day  Vimpat 200 mg oral tablet: 1 tab(s) orally 2 times a day MDD:400mg  Vitamin C: 1 tab(s) orally prn  Xcopri Titration Pack 12.5 mg-25 mg oral tablet: 1 tab(s) orally once a day (at bedtime) MDD:25mg

## 2021-06-10 NOTE — PROGRESS NOTE ADULT - SUBJECTIVE AND OBJECTIVE BOX
[facility] - RENETTA Henson / RENETTA Santos / OJ Rose/ RENETTA Staley/ RENETTA Michael/ FOX Sanchez / CHU Crum / JOSUE Abrams  ---------------------------------------------------------------------------------------------------------------  seen and examined today for hyponatremia  Interval : sodium much improved  VITALS:  T(F): 98.4 (06-10-21 @ 12:30), Max: 98.9 (06-09-21 @ 14:57)  HR: 77 (06-10-21 @ 12:30)  BP: 126/83 (06-10-21 @ 12:30)  RR: 18 (06-10-21 @ 12:30)  SpO2: 94% (06-10-21 @ 12:30)  Wt(kg): --    06-10 @ 07:01  -  06-10 @ 13:18  --------------------------------------------------------  IN: 220 mL / OUT: 0 mL / NET: 220 mL      Physical Exam :-  Constitutional: NAD  Neck: Supple.  Respiratory: Bilateral equal breath sounds,  Cardiovascular: S1, S2 normal,  Gastrointestinal: Bowel Sounds present, soft, non tender.  Extremities: No edema  Neurological: Alert and Oriented x 3, no focal deficits  Psychiatric: Normal mood, normal affect  Data:-  Allergies :   lamotrigine (Rash)    Hospital Medications:   MEDICATIONS  (STANDING):  heparin   Injectable 5000 Unit(s) SubCutaneous every 12 hours  levothyroxine 50 MICROGram(s) Oral daily  multivitamin 1 Tablet(s) Oral daily  PARoxetine 30 milliGRAM(s) Oral daily    06-10    135  |  101  |  12  ----------------------------<  112<H>  4.3   |  20<L>  |  0.67    Ca    9.1      10 Jeevan 2021 06:23  Phos  2.9     06-08  Mg     2.0     06-08    TPro  7.3  /  Alb  4.3  /  TBili  0.2  /  DBili      /  AST  21  /  ALT  33  /  AlkPhos  74  06-10    Creatinine Trend: 0.67 <--, 0.60 <--, 0.66 <--                        13.0   7.32  )-----------( 263      ( 10 Jeevan 2021 06:23 )             38.7

## 2021-06-10 NOTE — DISCHARGE NOTE PROVIDER - HOSPITAL COURSE
Patient is a 50 y/o female with a PMH of epilepsy, depression, and hypothyroidism who presented for hyponatremia found on outpatient labwork. Patient was admitted to the EMU for seizure capture and medication management. Patient underwent sodium repletion in EMU.   EEG showed evidence of :  Patient follows as outpatient with Dr. Hodge. Home AEDs on arrival: Keppra 1750 g AM and 2000 mg PM and Trileptal 450 mg BID. In EMU, Trileptal was discontinued and patient underwent taper and discontinuation of Keppra.   Discharge AEDs:     Patient is now clinically stable for discharge and outpatient follow-up. Patient is a 52 y/o female with a PMH of epilepsy, depression, and hypothyroidism who presented for hyponatremia found on outpatient labwork. Patient was admitted to the EMU for seizure capture and medication management. Patient underwent sodium repletion in EMU.   EEG showed evidence of focal seizure with secondary generalization.  EEG Summary:  Abnormal EEG in the awake, drowsy and asleep states.  - Focal seizure with impaired awareness and complex motor behavior (hyperventilation, vocalization, complex movements of limbs, sign of 4 with left arm extension, last clonic jerk in right upper extremity) followed by secondarily generalized tonic-clonic seizure.  Clinical onset preceded ictal EEG onset by 11 seconds.  Ictal EEG onset at vertex, with rhythmic alpha and theta activity seen maximally at Cz  -Aura (patient pressed seizure button at 00:49 with aura, no associated EEG changes)      Impression/Clinical Correlate:  One focal seizure with secondary generalization recorded with EEG onset at the vertex and lateralizing semiology to the right hemisphere.  One aura recorded with no associated EEG changes.    Patient follows as outpatient with Dr. Hodge. Home AEDs on arrival: Keppra 1750 g AM and 2000 mg PM and Trileptal 450 mg BID. In EMU, Trileptal was discontinued and patient underwent taper and discontinuation of Keppra. She was loaded with 200mg vimpat and then started on vimpat 150mg bid and Xcopri 12.5mg qHS.     Discharge AEDs:   Vimpat 200mg twice a day by mouth  Xcopri 12.5mg at night with instructions to uptitrate    Patient is now clinically stable for discharge and outpatient follow-up. Patient is a 52 y/o female with a PMH of epilepsy, depression, and hypothyroidism who presented for hyponatremia found on outpatient labwork. Patient was admitted to the EMU for seizure capture and medication management. Patient underwent sodium repletion in EMU.   EEG showed evidence of focal seizure with secondary generalization.  EEG Summary:  Abnormal EEG in the awake, drowsy and asleep states.  - Focal seizure with impaired awareness and complex motor behavior (hyperventilation, vocalization, complex movements of limbs, sign of 4 with left arm extension, last clonic jerk in right upper extremity) followed by secondarily generalized tonic-clonic seizure.  Clinical onset preceded ictal EEG onset by 11 seconds.  Ictal EEG onset at vertex, with rhythmic alpha and theta activity seen maximally at Cz  -Aura (patient pressed seizure button at 00:49 with aura, no associated EEG changes)      Impression/Clinical Correlate:  One focal seizure with secondary generalization recorded with EEG onset at the vertex and lateralizing semiology to the right hemisphere.  One aura recorded with no associated EEG changes.    MRI head epilepsy protocol demonstrated []     Patient follows as outpatient with Dr. Hodge. Home AEDs on arrival: Keppra 1750 g AM and 2000 mg PM and Trileptal 450 mg BID. In EMU, Trileptal was discontinued and patient underwent taper and discontinuation of Keppra. She was loaded with 200mg vimpat and then started on vimpat 150mg bid and Xcopri 12.5mg qHS.     Discharge AEDs:   Vimpat 200mg twice a day by mouth  Xcopri 12.5mg at night with instructions to uptitrate    Patient is now clinically stable for discharge and outpatient follow-up. Patient is a 52 y/o female with a PMH of epilepsy, depression, and hypothyroidism who presented for hyponatremia found on outpatient labwork. Patient was admitted to the EMU for seizure capture and medication management. Patient underwent sodium repletion in EMU.   EEG showed evidence of focal seizure with secondary generalization.  EEG Summary:  Abnormal EEG in the awake, drowsy and asleep states.  - Focal seizure with impaired awareness and complex motor behavior (hyperventilation, vocalization, complex movements of limbs, sign of 4 with left arm extension, last clonic jerk in right upper extremity) followed by secondarily generalized tonic-clonic seizure.  Clinical onset preceded ictal EEG onset by 11 seconds.  Ictal EEG onset at vertex, with rhythmic alpha and theta activity seen maximally at Cz  -Aura (patient pressed seizure button at 00:49 with aura, no associated EEG changes)      Impression/Clinical Correlate:  One focal seizure with secondary generalization recorded with EEG onset at the vertex and lateralizing semiology to the right hemisphere.  One aura recorded with no associated EEG changes.      Patient follows as outpatient with Dr. Hodge. Home AEDs on arrival: Keppra 1750 g AM and 2000 mg PM and Trileptal 450 mg BID. In EMU, Trileptal was discontinued and patient underwent taper and discontinuation of Keppra. She was loaded with 200mg vimpat and then started on vimpat 150mg bid and Xcopri 12.5mg qHS (12.5mg for two weeks then if tolerated titrated to 25mg qhs)    Discharge AEDs:   Vimpat 200mg twice a day by mouth  Xcopri 12.5mg at night with instructions to uptitrate    Patient is now clinically stable for discharge on 6/12/21 and will be followed up in the outpatient setting.

## 2021-06-10 NOTE — PROGRESS NOTE ADULT - ASSESSMENT
51F hx seizures, depression p/w hyponatremia. Pt w/ last seizure in mid-may, started on oxcarbazepine BID. Noted on routine labwork y/d to be hyponatremic to 127.    Hyponatremia  Likely related to Oxcarbazepine (up to 10% of cases have hyponatremia)  much improved  fluid restriction of 1000cc  NaCl 2gr BID -> stop for now  BMP qD  stop Oxcarbazepine  Appreciate starting on Levothyroxine        For any question, call:  Cell # [phone]  Pager # [phone]  Callback # [phone]

## 2021-06-10 NOTE — DISCHARGE NOTE PROVIDER - CARE PROVIDER_API CALL
Neptali Hodge  NEUROLOGY  [facility], [address] [place] [address]  Phone: [phone]  Fax: [phone]  Follow Up Time:

## 2021-06-10 NOTE — PROGRESS NOTE ADULT - ASSESSMENT
51 f with    Hyponatremia improving   - fluid restrict  - follow Na , lytes  - Nephrology follow    Seizures  - continue Rx  - EEG in progress  - Neurology follow    Hypothyroid  - follow TSH  - Synthroid 50 mcg    Depression  - continue Rx    DVT prophylaxis     David Grey MD pager [phone]

## 2021-06-10 NOTE — PROGRESS NOTE ADULT - SUBJECTIVE AND OBJECTIVE BOX
SUBJECTIVE: patient seen and examined at bedside.     MEDICATIONS (HOME):  Home Medications:  Daily Vahid oral tablet: 1 tab(s) orally once a day (2021 11:35)  levETIRAcetam: 1750mg in the morning  and 2,000mg  in the evening  (2021 11:35)  levothyroxine 50 mcg (0.05 mg) oral tablet: 1 tab(s) orally once a day (2021 11:35)  OXcarbazepine 300 mg oral tablet: 1.5 tab(s) (450mg) orally 2 times a day    Note:Pt was to hold medication. (2021 11:35)  PARoxetine 30 mg oral tablet: 1 tab(s) orally once a day (2021 11:35)  Vitamin C: 1 tab(s) orally prn (2021 11:35)    MEDICATIONS  (STANDING):  heparin   Injectable 5000 Unit(s) SubCutaneous every 12 hours  levothyroxine 50 MICROGram(s) Oral daily  multivitamin 1 Tablet(s) Oral daily  PARoxetine 30 milliGRAM(s) Oral daily  sodium chloride 2 Gram(s) Oral two times a day    MEDICATIONS  (PRN):  acetaminophen   Tablet .. 650 milliGRAM(s) Oral every 6 hours PRN Temp greater or equal to 38.5C (101.3F), Mild Pain (1 - 3)  levETIRAcetam  IVPB 1500 milliGRAM(s) IV Intermittent once PRN GTC or seizure activity > 3 min refractory to ativan  LORazepam   Injectable 2 milliGRAM(s) IV Push once PRN GTC or focal seizure > 3 min    ALLERGIES/INTOLERANCES:  Allergies  lamotrigine (Rash)    VITALS & EXAMINATION:  Vital Signs Last 24 Hrs  T(C): 36.6 (10 Jeevan 2021 05:00), Max: 37.2 (2021 14:57)  T(F): 97.9 (10 Jeevan 2021 05:00), Max: 98.9 (2021 14:57)  HR: 71 (10 Jeevan 2021 05:00) (67 - 90)  BP: 104/61 (10 Jeevan 2021 05:00) (104/58 - 156/81)  BP(mean): --  RR: 18 (10 Jeevan 2021 05:00) (18 - 20)  SpO2: 97% (10 Jeevan 2021 05:00) (94% - 97%)    Neurological (>12):  MS: Awake, alert, oriented to person, place, situation, time. Normal affect. Follows all commands.    Language: Speech is clear, fluent with good repetition & comprehension.    CNs: PERRLA (R = 3mm, L = 3mm). VF intact in all 4 quadrants. EOMI no nystagmus. V1-3 intact to LT. No facial asymmetry b/l. Symmetric palate elevation in midline. Shoulder shrug intact b/l. Tongue midline, normal movements, no atrophy.    Motor: Normal muscle bulk & tone. No noticeable tremor or seizure. No pronator drift.              Deltoid	Biceps	Triceps	   R	5	5	5	5		  L	5	5	5	5	    	H-Flex	H-Ext	K-Flex	K-Ext	D-Flex	P-Flex  R	5	5	5	5	5	5	   L	5	5	5	5	5	5	     Sensation: Intact to LT throughout.     Reflexes:              Biceps(C5)       BR(C6)     Triceps(C7)               Patellar(L4)    Achilles(S1)    Plantar Resp  R	2	          2	             2		        2		    2		Down   L	2	          2	             2		        2		    2		Down     Coordination: No dysmetria to FTN    Gait: Normal gait.     LABORATORY:  CBC                       13.0   7.32  )-----------( 263      ( 10 Jeevan 2021 06:23 )             38.7     Chem 06-10    135  |  101  |  12  ----------------------------<  112<H>  4.3   |  20<L>  |  0.67    Ca    9.1      10 Jeevan 2021 06:23  Phos  2.9     06-08  Mg     2.0     06-08    TPro  7.3  /  Alb  4.3  /  TBili  0.2  /  DBili  x   /  AST  21  /  ALT  33  /  AlkPhos  74  06-10    LFTs LIVER FUNCTIONS - ( 10 Jeevan 2021 06:23 )  Alb: 4.3 g/dL / Pro: 7.3 g/dL / ALK PHOS: 74 U/L / ALT: 33 U/L / AST: 21 U/L / GGT: x           Coagulopathy   Lipid Panel   A1c   Cardiac enzymes CARDIAC MARKERS ( 10 Jeevan 2021 06:23 )  x     / x     / 56 U/L / x     / x          U/A Urinalysis Basic - ( 2021 23:58 )    Color: Light Yellow / Appearance: Clear / S.016 / pH: x  Gluc: x / Ketone: Small  / Bili: Negative / Urobili: Negative   Blood: x / Protein: Trace / Nitrite: Negative   Leuk Esterase: Negative / RBC: 1 /hpf / WBC 0 /HPF   Sq Epi: x / Non Sq Epi: 2 /hpf / Bacteria: Negative    STUDIES & IMAGING:  Studies (EKG, EEG, EMG, etc):     Radiology (XR, CT, MR, U/S, TTE/WHITNEY):    CT Head No Cont (21 @ 19:20)  IMPRESSION:    No CT evidence of acute traumatic injury to the head and cervical spine.       SUBJECTIVE: patient seen and examined at bedside.     MEDICATIONS (HOME):  Home Medications:  Daily Vahid oral tablet: 1 tab(s) orally once a day (2021 11:35)  levETIRAcetam: 1750mg in the morning  and 2,000mg  in the evening  (2021 11:35)  levothyroxine 50 mcg (0.05 mg) oral tablet: 1 tab(s) orally once a day (2021 11:35)  OXcarbazepine 300 mg oral tablet: 1.5 tab(s) (450mg) orally 2 times a day    Note:Pt was to hold medication. (2021 11:35)  PARoxetine 30 mg oral tablet: 1 tab(s) orally once a day (2021 11:35)  Vitamin C: 1 tab(s) orally prn (2021 11:35)    MEDICATIONS  (STANDING):  heparin   Injectable 5000 Unit(s) SubCutaneous every 12 hours  levothyroxine 50 MICROGram(s) Oral daily  multivitamin 1 Tablet(s) Oral daily  PARoxetine 30 milliGRAM(s) Oral daily  sodium chloride 2 Gram(s) Oral two times a day    MEDICATIONS  (PRN):  acetaminophen   Tablet .. 650 milliGRAM(s) Oral every 6 hours PRN Temp greater or equal to 38.5C (101.3F), Mild Pain (1 - 3)  levETIRAcetam  IVPB 1500 milliGRAM(s) IV Intermittent once PRN GTC or seizure activity > 3 min refractory to ativan  LORazepam   Injectable 2 milliGRAM(s) IV Push once PRN GTC or focal seizure > 3 min    ALLERGIES/INTOLERANCES:  Allergies  lamotrigine (Rash)    VITALS & EXAMINATION:  Vital Signs Last 24 Hrs  T(C): 36.6 (10 Jeevan 2021 05:00), Max: 37.2 (2021 14:57)  T(F): 97.9 (10 Jeevan 2021 05:00), Max: 98.9 (2021 14:57)  HR: 71 (10 Jeevan 2021 05:00) (67 - 90)  BP: 104/61 (10 Jeevan 2021 05:00) (104/58 - 156/81)  BP(mean): --  RR: 18 (10 Jeevan 2021 05:00) (18 - 20)  SpO2: 97% (10 Jeevan 2021 05:00) (94% - 97%)    Neurological (>12):  General: generalized tremulousness and anxious, in no acute distress  MS: Awake, alert, oriented to person, place, situation, time. Normal affect. Follows all commands.    Language: Speech is clear, fluent with good repetition & comprehension.    CNs: PERRLA (R = 3mm, L = 3mm). VF intact in all 4 quadrants. EOMI no nystagmus. V1-3 intact to LT. No facial asymmetry b/l. Symmetric palate elevation in midline. Shoulder shrug intact b/l. Tongue midline, normal movements, no atrophy.    Motor: Normal muscle bulk & tone. No noticeable tremor or seizure. No pronator drift.              Deltoid	Biceps	Triceps	   R	5	5	5	5		  L	5	5	5	5	    	H-Flex	H-Ext	K-Flex	K-Ext	D-Flex	P-Flex  R	5	5	5	5	5	5	   L	5	5	5	5	5	5	     Sensation: Intact to LT throughout.     Reflexes:              Biceps(C5)       BR(C6)     Triceps(C7)               Patellar(L4)    Achilles(S1)    Plantar Resp  R	2	          2	             2		        2		    2		Down   L	2	          2	             2		        2		    2		Down     Coordination: No dysmetria to FTN    Gait: Normal gait.     LABORATORY:  CBC                       13.0   7.32  )-----------( 263      ( 10 Jeevan 2021 06:23 )             38.7     Chem 06-10    135  |  101  |  12  ----------------------------<  112<H>  4.3   |  20<L>  |  0.67    Ca    9.1      10 Jeevan 2021 06:23  Phos  2.9     06-08  Mg     2.0     06-08    TPro  7.3  /  Alb  4.3  /  TBili  0.2  /  DBili  x   /  AST  21  /  ALT  33  /  AlkPhos  74  06-10    LFTs LIVER FUNCTIONS - ( 10 Jeevan 2021 06:23 )  Alb: 4.3 g/dL / Pro: 7.3 g/dL / ALK PHOS: 74 U/L / ALT: 33 U/L / AST: 21 U/L / GGT: x           Coagulopathy   Lipid Panel   A1c   Cardiac enzymes CARDIAC MARKERS ( 10 Jeevan 2021 06:23 )  x     / x     / 56 U/L / x     / x          U/A Urinalysis Basic - ( 2021 23:58 )    Color: Light Yellow / Appearance: Clear / S.016 / pH: x  Gluc: x / Ketone: Small  / Bili: Negative / Urobili: Negative   Blood: x / Protein: Trace / Nitrite: Negative   Leuk Esterase: Negative / RBC: 1 /hpf / WBC 0 /HPF   Sq Epi: x / Non Sq Epi: 2 /hpf / Bacteria: Negative    STUDIES & IMAGING:  Studies (EKG, EEG, EMG, etc):     Radiology (XR, CT, MR, U/S, TTE/WHITNEY):    CT Head No Cont (21 @ 19:20)  IMPRESSION:    No CT evidence of acute traumatic injury to the head and cervical spine.       SUBJECTIVE: patient seen and examined at bedside. she reports after a seizure she feels b/l lower extremity heaviness that resolves after some time.     MEDICATIONS (HOME):  Home Medications:  Daily Vahid oral tablet: 1 tab(s) orally once a day (2021 11:35)  levETIRAcetam: 1750mg in the morning  and 2,000mg  in the evening  (2021 11:35)  levothyroxine 50 mcg (0.05 mg) oral tablet: 1 tab(s) orally once a day (2021 11:35)  OXcarbazepine 300 mg oral tablet: 1.5 tab(s) (450mg) orally 2 times a day    Note:Pt was to hold medication. (2021 11:35)  PARoxetine 30 mg oral tablet: 1 tab(s) orally once a day (2021 11:35)  Vitamin C: 1 tab(s) orally prn (2021 11:35)    MEDICATIONS  (STANDING):  heparin   Injectable 5000 Unit(s) SubCutaneous every 12 hours  levothyroxine 50 MICROGram(s) Oral daily  multivitamin 1 Tablet(s) Oral daily  PARoxetine 30 milliGRAM(s) Oral daily  sodium chloride 2 Gram(s) Oral two times a day    MEDICATIONS  (PRN):  acetaminophen   Tablet .. 650 milliGRAM(s) Oral every 6 hours PRN Temp greater or equal to 38.5C (101.3F), Mild Pain (1 - 3)  levETIRAcetam  IVPB 1500 milliGRAM(s) IV Intermittent once PRN GTC or seizure activity > 3 min refractory to ativan  LORazepam   Injectable 2 milliGRAM(s) IV Push once PRN GTC or focal seizure > 3 min    ALLERGIES/INTOLERANCES:  Allergies  lamotrigine (Rash)    VITALS & EXAMINATION:  Vital Signs Last 24 Hrs  T(C): 36.6 (10 Jeevan 2021 05:00), Max: 37.2 (2021 14:57)  T(F): 97.9 (10 Jeevan 2021 05:00), Max: 98.9 (2021 14:57)  HR: 71 (10 Jeevan 2021 05:00) (67 - 90)  BP: 104/61 (10 Jeevan 2021 05:00) (104/58 - 156/81)  BP(mean): --  RR: 18 (10 Jeevan 2021 05:00) (18 - 20)  SpO2: 97% (10 Jeevan 2021 05:00) (94% - 97%)    Neurological (>12):  General: generalized tremulousness and anxious, in no acute distress  MS: Awake, alert, oriented to person, place, situation, time. Normal affect. Follows all commands.    Language: Speech is clear, fluent with good repetition & comprehension.    CNs: PERRLA (R = 3mm, L = 3mm). VF intact in all 4 quadrants. EOMI no nystagmus. V1-3 intact to LT. No facial asymmetry b/l. Symmetric palate elevation in midline. Shoulder shrug intact b/l. Tongue midline, normal movements, no atrophy.    Motor: Normal muscle bulk & tone. No noticeable tremor or seizure. No pronator drift.              Deltoid	Biceps	Triceps	   R	5	5	5	5		  L	5	5	5	5	    	H-Flex	H-Ext	K-Flex	K-Ext	D-Flex	P-Flex  R	5	5	5	5	5	5	   L	5	5	5	5	5	5	     Sensation: Intact to LT throughout.     Reflexes:              Biceps(C5)       BR(C6)     Triceps(C7)               Patellar(L4)    Achilles(S1)    Plantar Resp  R	2	          2	             2		        2		    2		Down   L	2	          2	             2		        2		    2		Down     Coordination: No dysmetria to FTN    Gait: Normal gait.     LABORATORY:  CBC                       13.0   7.32  )-----------( 263      ( 10 Jeevan 2021 06:23 )             38.7     Chem 06-10    135  |  101  |  12  ----------------------------<  112<H>  4.3   |  20<L>  |  0.67    Ca    9.1      10 Jeevan 2021 06:23  Phos  2.9     06-08  Mg     2.0     06-08    TPro  7.3  /  Alb  4.3  /  TBili  0.2  /  DBili  x   /  AST  21  /  ALT  33  /  AlkPhos  74  06-10    LFTs LIVER FUNCTIONS - ( 10 Jeevan 2021 06:23 )  Alb: 4.3 g/dL / Pro: 7.3 g/dL / ALK PHOS: 74 U/L / ALT: 33 U/L / AST: 21 U/L / GGT: x           Coagulopathy   Lipid Panel   A1c   Cardiac enzymes CARDIAC MARKERS ( 10 Jeevan 2021 06:23 )  x     / x     / 56 U/L / x     / x          U/A Urinalysis Basic - ( 2021 23:58 )    Color: Light Yellow / Appearance: Clear / S.016 / pH: x  Gluc: x / Ketone: Small  / Bili: Negative / Urobili: Negative   Blood: x / Protein: Trace / Nitrite: Negative   Leuk Esterase: Negative / RBC: 1 /hpf / WBC 0 /HPF   Sq Epi: x / Non Sq Epi: 2 /hpf / Bacteria: Negative    STUDIES & IMAGING:  Studies (EKG, EEG, EMG, etc):     Radiology (XR, CT, MR, U/S, TTE/WHITNEY):    CT Head No Cont (.. @ 19:20)  IMPRESSION:    No CT evidence of acute traumatic injury to the head and cervical spine.

## 2021-06-11 LAB
ANION GAP SERPL CALC-SCNC: 12 MMOL/L — SIGNIFICANT CHANGE UP (ref 5–17)
BUN SERPL-MCNC: 15 MG/DL — SIGNIFICANT CHANGE UP (ref 7–23)
CALCIUM SERPL-MCNC: 9.1 MG/DL — SIGNIFICANT CHANGE UP (ref 8.4–10.5)
CHLORIDE SERPL-SCNC: 103 MMOL/L — SIGNIFICANT CHANGE UP (ref 96–108)
CO2 SERPL-SCNC: 21 MMOL/L — LOW (ref 22–31)
CREAT SERPL-MCNC: 0.66 MG/DL — SIGNIFICANT CHANGE UP (ref 0.5–1.3)
GLUCOSE SERPL-MCNC: 116 MG/DL — HIGH (ref 70–99)
MAGNESIUM SERPL-MCNC: 2.2 MG/DL — SIGNIFICANT CHANGE UP (ref 1.6–2.6)
PHOSPHATE SERPL-MCNC: 3.2 MG/DL — SIGNIFICANT CHANGE UP (ref 2.5–4.5)
POTASSIUM SERPL-MCNC: 4.1 MMOL/L — SIGNIFICANT CHANGE UP (ref 3.5–5.3)
POTASSIUM SERPL-SCNC: 4.1 MMOL/L — SIGNIFICANT CHANGE UP (ref 3.5–5.3)
SODIUM SERPL-SCNC: 136 MMOL/L — SIGNIFICANT CHANGE UP (ref 135–145)

## 2021-06-11 PROCEDURE — 95720 EEG PHY/QHP EA INCR W/VEEG: CPT

## 2021-06-11 RX ORDER — LACOSAMIDE 50 MG/1
200 TABLET ORAL EVERY 12 HOURS
Refills: 0 | Status: DISCONTINUED | OUTPATIENT
Start: 2021-06-12 | End: 2021-06-12

## 2021-06-11 RX ORDER — LACOSAMIDE 50 MG/1
200 TABLET ORAL ONCE
Refills: 0 | Status: DISCONTINUED | OUTPATIENT
Start: 2021-06-11 | End: 2021-06-11

## 2021-06-11 RX ORDER — LACOSAMIDE 50 MG/1
150 TABLET ORAL EVERY 12 HOURS
Refills: 0 | Status: DISCONTINUED | OUTPATIENT
Start: 2021-06-11 | End: 2021-06-12

## 2021-06-11 RX ORDER — OXCARBAZEPINE 300 MG/1
1.5 TABLET, FILM COATED ORAL
Qty: 0 | Refills: 0 | DISCHARGE

## 2021-06-11 RX ORDER — LACOSAMIDE 50 MG/1
150 TABLET ORAL EVERY 12 HOURS
Refills: 0 | Status: DISCONTINUED | OUTPATIENT
Start: 2021-06-11 | End: 2021-06-11

## 2021-06-11 RX ADMIN — Medication 1 TABLET(S): at 11:25

## 2021-06-11 RX ADMIN — HEPARIN SODIUM 5000 UNIT(S): 5000 INJECTION INTRAVENOUS; SUBCUTANEOUS at 05:53

## 2021-06-11 RX ADMIN — Medication 2 MILLIGRAM(S): at 00:05

## 2021-06-11 RX ADMIN — LACOSAMIDE 130 MILLIGRAM(S): 50 TABLET ORAL at 13:36

## 2021-06-11 RX ADMIN — Medication 30 MILLIGRAM(S): at 20:59

## 2021-06-11 RX ADMIN — HEPARIN SODIUM 5000 UNIT(S): 5000 INJECTION INTRAVENOUS; SUBCUTANEOUS at 17:49

## 2021-06-11 RX ADMIN — Medication 50 MICROGRAM(S): at 05:53

## 2021-06-11 RX ADMIN — LACOSAMIDE 200 MILLIGRAM(S): 50 TABLET ORAL at 09:30

## 2021-06-11 NOTE — PROGRESS NOTE ADULT - ASSESSMENT
51 year old woman h/o 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 Events of unknown duration    Recs:  [] c/w vEEG - patient had one GTC overnight 6/10 ~23:46  [] home keppra 1750mg AM | 2000mg PM HELD   [] keppra 500mg bid x 2 doses then STOP  [] discontinued oxcarbazepine  [] salt tabs dc'd per nephro, still on free water restricted diet  [] trend CBC (wnl), CMP (sodium improving), Mg, P, CK (56), ammonia   [] neurochecks q4h  [] DVT PPx with Lovenox 40mg subq daily  [] advise patient to not drive, operate heavy machinery, avoid heights, pools, bathtubs, locked doors.  [] will f/u outpatient upon discharge with epilepsy neurology Dr. Neptali Hodge ([phone])    RESCUE:  ativan 2mg IV for GTC > 3 min or deranagement of vital signs  vimpat 200mg IVP if refractory    mgmt to be d/w Epilepsy Dr. Ly

## 2021-06-11 NOTE — CHART NOTE - NSCHARTNOTEFT_GEN_A_CORE
Notified by RN that patient was having seizure activity at approx 2345. Upon evaluation patient was found to be having GTC that lasted approx 2.5 minutes. Patient became post ictal without any further GTC activity but was extremely agitated. Contacted attending who recommended patient receive 2mg ativan. Approx 5 minutes later patient calmed down and no further seizure activity seen. No further interventions needed at this time.

## 2021-06-11 NOTE — PROGRESS NOTE ADULT - SUBJECTIVE AND OBJECTIVE BOX
SUBJECTIVE: patient seen and examined at bedside. Had GTC overnight resolved with 2mg ativan.     MEDICATIONS (HOME):  Home Medications:  Daily Vahid oral tablet: 1 tab(s) orally once a day (09 Jun 2021 11:35)  levETIRAcetam: 1750mg in the morning  and 2,000mg  in the evening  (09 Jun 2021 11:35)  levothyroxine 50 mcg (0.05 mg) oral tablet: 1 tab(s) orally once a day (09 Jun 2021 11:35)  OXcarbazepine 300 mg oral tablet: 1.5 tab(s) (450mg) orally 2 times a day    Note:Pt was to hold medication. (09 Jun 2021 11:35)  PARoxetine 30 mg oral tablet: 1 tab(s) orally once a day (09 Jun 2021 11:35)  Vitamin C: 1 tab(s) orally prn (09 Jun 2021 11:35)    MEDICATIONS  (STANDING):  heparin   Injectable 5000 Unit(s) SubCutaneous every 12 hours  levothyroxine 50 MICROGram(s) Oral daily  multivitamin 1 Tablet(s) Oral daily  PARoxetine 30 milliGRAM(s) Oral daily    MEDICATIONS  (PRN):  acetaminophen   Tablet .. 650 milliGRAM(s) Oral every 6 hours PRN Temp greater or equal to 38.5C (101.3F), Mild Pain (1 - 3)  lacosamide Injectable 200 milliGRAM(s) IV Push once PRN gtc > 3 mins if not responding to ativan  LORazepam   Injectable 2 milliGRAM(s) IV Push once PRN GTC or focal seizure > 3 min    ALLERGIES/INTOLERANCES:  Allergies  lamotrigine (Rash)    VITALS & EXAMINATION:  Vital Signs Last 24 Hrs  T(C): 36.7 (11 Jun 2021 05:36), Max: 37.1 (10 Jeevan 2021 19:08)  T(F): 98.1 (11 Jun 2021 05:36), Max: 98.8 (10 Jeevan 2021 19:08)  HR: 94 (11 Jun 2021 05:36) (77 - 157)  BP: 108/72 (11 Jun 2021 05:36) (108/72 - 171/89)  BP(mean): --  RR: 18 (11 Jun 2021 05:36) (18 - 20)  SpO2: 95% (11 Jun 2021 05:36) (94% - 99%)    Neurological (>12):  General: generalized tremulousness and anxious, in no acute distress  MS: Awake, alert, oriented to person, place, situation, time. Normal affect. Follows all commands.    Language: Speech is clear, fluent with good repetition & comprehension.    CNs: PERRLA (R = 3mm, L = 3mm). VF intact in all 4 quadrants. EOMI no nystagmus. V1-3 intact to LT. No facial asymmetry b/l. Symmetric palate elevation in midline. Shoulder shrug intact b/l. Tongue lac on []    Motor: Normal muscle bulk & tone. No noticeable tremor or seizure. No pronator drift.              Deltoid	Biceps	Triceps	   R	5	5	5	5		  L	5	5	5	5	    	H-Flex	H-Ext	K-Flex	K-Ext	D-Flex	P-Flex  R	5	5	5	5	5	5	   L	5	5	5	5	5	5	     Sensation: Intact to LT throughout.     Coordination: No dysmetria to FTN    Gait: Normal gait.       LABORATORY:  CBC                       13.0   7.32  )-----------( 263      ( 10 Jeevan 2021 06:23 )             38.7     Chem 06-11    136  |  103  |  15  ----------------------------<  116<H>  4.1   |  21<L>  |  0.66    Ca    9.1      11 Jun 2021 06:10  Phos  3.2     06-11  Mg     2.2     06-11    TPro  7.3  /  Alb  4.3  /  TBili  0.2  /  DBili  x   /  AST  21  /  ALT  33  /  AlkPhos  74  06-10    LFTs LIVER FUNCTIONS - ( 10 Jeevan 2021 06:23 )  Alb: 4.3 g/dL / Pro: 7.3 g/dL / ALK PHOS: 74 U/L / ALT: 33 U/L / AST: 21 U/L / GGT: x           Coagulopathy   Lipid Panel   A1c   Cardiac enzymes CARDIAC MARKERS ( 10 Jeevan 2021 06:23 )  x     / x     / 56 U/L / x     / x          STUDIES & IMAGING:  Studies (EKG, EEG, EMG, etc):     Radiology (XR, CT, MR, U/S, TTE/WHITNEY):

## 2021-06-11 NOTE — PROGRESS NOTE ADULT - SUBJECTIVE AND OBJECTIVE BOX
Patient is a 51y old  Female who presents with a chief complaint of hyponatremia (10 Jeevan 2021 16:26)      SUBJECTIVE / OVERNIGHT EVENTS: had seizure.  Review of Systems  chest pain no  palpitations no  sob no  nausea no  headache no    MEDICATIONS  (STANDING):  heparin   Injectable 5000 Unit(s) SubCutaneous every 12 hours  lacosamide IVPB 150 milliGRAM(s) IV Intermittent every 12 hours  levothyroxine 50 MICROGram(s) Oral daily  multivitamin 1 Tablet(s) Oral daily  PARoxetine 30 milliGRAM(s) Oral daily  Xcopri (Cenobamate) 12.5 milliGRAM(s) 12.5 milliGRAM(s) Oral at bedtime    MEDICATIONS  (PRN):  acetaminophen   Tablet .. 650 milliGRAM(s) Oral every 6 hours PRN Temp greater or equal to 38.5C (101.3F), Mild Pain (1 - 3)  lacosamide Injectable 200 milliGRAM(s) IV Push once PRN gtc > 3 mins if not responding to ativan  LORazepam   Injectable 2 milliGRAM(s) IV Push once PRN GTC or focal seizure > 3 min      Vital Signs Last 24 Hrs  T(C): 37.1 (11 Jun 2021 16:41), Max: 37.1 (11 Jun 2021 11:28)  T(F): 98.8 (11 Jun 2021 16:41), Max: 98.8 (11 Jun 2021 16:41)  HR: 90 (11 Jun 2021 16:41) (80 - 157)  BP: 127/79 (11 Jun 2021 16:41) (108/72 - 171/89)  BP(mean): --  RR: 18 (11 Jun 2021 16:41) (18 - 20)  SpO2: 96% (11 Jun 2021 16:41) (95% - 99%)    PHYSICAL EXAM:  GENERAL: NAD, well-developed  HEAD:  Atraumatic, Normocephalic  EYES: EOMI, PERRLA, conjunctiva and sclera clear  NECK: Supple, No JVD  CHEST/LUNG: Clear to auscultation bilaterally; No wheeze  HEART: Regular rate and rhythm; No murmurs, rubs, or gallops  ABDOMEN: Soft, Nontender, Nondistended; Bowel sounds present  EXTREMITIES:  2+ Peripheral Pulses, No clubbing, cyanosis, or edema  PSYCH: AAOx3, anxious  NEUROLOGY: non-focal  SKIN: No rashes or lesions    LABS:                        13.0   7.32  )-----------( 263      ( 10 Jeevan 2021 06:23 )             38.7     06-11    136  |  103  |  15  ----------------------------<  116<H>  4.1   |  21<L>  |  0.66    Ca    9.1      11 Jun 2021 06:10  Phos  3.2     06-11  Mg     2.2     06-11    TPro  7.3  /  Alb  4.3  /  TBili  0.2  /  DBili  x   /  AST  21  /  ALT  33  /  AlkPhos  74  06-10      CARDIAC MARKERS ( 10 Jeevan 2021 06:23 )  x     / x     / 56 U/L / x     / x                RADIOLOGY & ADDITIONAL TESTS:    Imaging Personally Reviewed:    Consultant(s) Notes Reviewed:      Care Discussed with Consultants/Other Providers:

## 2021-06-11 NOTE — PROVIDER CONTACT NOTE (MEDICATION) - SITUATION
pt ordered Xcopri 12.5 mg oral at bedtime not given 6/11 due to not available. medication labelled as "at home medication." pt says she has not taken this medication before. in report day RN said medication was awaiting insurance approval. Called pharmacy and was told they never received this rx to load in the pyxis on the floor

## 2021-06-11 NOTE — EEG REPORT - NS EEG TEXT BOX
Start Date: 6/10/2021 – Day 1                                Start Time – 08:00       Duration – 23hr 58m    Daily EEG Visual Analysis  Findings: The background was continuous, spontaneously variable and reactive. During wakefulness, the posterior dominant rhythm consisted of symmetric, well-modulated 10 Hz activity, with amplitude to 30 uV, that attenuated to eye opening.     Background Slowing:  No generalized background slowing was present.    Focal Slowing:   None were present.    Sleep Background:  Drowsiness was characterized by fragmentation, attenuation, and slowing of the background activity.    Sleep was characterized by the presence of vertex waves, symmetric sleep spindles and K-complexes.    Other Non-Epileptiform Findings:  None were present.    Interictal Epileptiform Activity:   None were present.    Events:    SEIZURE #1: Focal seizure with impaired awareness and complex motor behavior (hyperventilation, vocalization, complex movements of limbs, sign of 4 with left arm extension, last clonic jerk in right upper extremity) followed by secondarily generalized tonic-clonic seizure.  Clinical onset preceded ictal EEG onset by 11 seconds.  Ictal EEG onset at vertex, with rhythmic alpha and theta activity seen maximally at Cz    23:44:00		SEIZURE #1  d1 23:44:03		ADJUSTING PILLOWS  d1 23:44:11		REACHES FOR SEIZURE BUTTON  d1 23:44:17		HYPERVENTILATION  d1 23:44:17		Patient Event  d1 23:44:22		EEG ONSET - ALPHA/THETA AT VERTEX, CZ  d1 23:44:22		COMPLEX MOTOR BEHAVIOR  d1 23:44:23		Patient Event  d1 23:44:26		Seizure onset  d1 23:44:26		Patient Event  d1 23:44:27		EVOLVING ALPHA/THETA AT VERTEX, CZ  d1 23:44:35		VOCALIZATION  d1 23:44:47		SIGN OF 4 WITH LEFT ARM EXTENSION  d1 23:45:08		CLONIC PHASE  d1 23:45:36		LAST CLONIC JERK IN RIGHT ARM  d1 23:45:37		offset (electrographic)  d1 23:45:42		POSTICTAL EYE DEVIATION TO THE LEFT      Artifacts:  Intermittent myogenic and movement artifacts were noted.    ECG:  The heart rate on single channel ECG was predominantly between 60-80 BPM.      EEG Summary:  Abnormal EEG in the awake, drowsy and asleep states.  - Focal seizure with impaired awareness and complex motor behavior (hyperventilation, vocalization, complex movements of limbs, sign of 4 with left arm extension, last clonic jerk in right upper extremity) followed by secondarily generalized tonic-clonic seizure.  Clinical onset preceded ictal EEG onset by 11 seconds.  Ictal EEG onset at vertex, with rhythmic alpha and theta activity seen maximally at Cz    Impression/Clinical Correlate:  One focal seizure with secondary generalization recorded with EEG onset at the vertex and lateralizing semiology to the right hemisphere.    Bebeto Painter MD  Neurology Attending Physician     Start Date: 6/10/2021 – Day 1                                Start Time – 08:00       Duration – 23hr 58m    Daily EEG Visual Analysis  Findings: The background was continuous, spontaneously variable and reactive. During wakefulness, the posterior dominant rhythm consisted of symmetric, well-modulated 10 Hz activity, with amplitude to 30 uV, that attenuated to eye opening.     Background Slowing:  No generalized background slowing was present.    Focal Slowing:   None were present.    Sleep Background:  Drowsiness was characterized by fragmentation, attenuation, and slowing of the background activity.    Sleep was characterized by the presence of vertex waves, symmetric sleep spindles and K-complexes.    Other Non-Epileptiform Findings:  None were present.    Interictal Epileptiform Activity:   None were present.    Events:    SEIZURE #1: Focal seizure with impaired awareness and complex motor behavior (hyperventilation, vocalization, complex movements of limbs, sign of 4 with left arm extension, last clonic jerk in right upper extremity) followed by secondarily generalized tonic-clonic seizure.  Clinical onset preceded ictal EEG onset by 11 seconds.  Ictal EEG onset at vertex, with rhythmic alpha and theta activity seen maximally at Cz    23:44:00		SEIZURE #1  d1 23:44:03		ADJUSTING PILLOWS  d1 23:44:11		REACHES FOR SEIZURE BUTTON  d1 23:44:17		HYPERVENTILATION  d1 23:44:17		Patient Event  d1 23:44:22		EEG ONSET - ALPHA/THETA AT VERTEX, CZ  d1 23:44:22		COMPLEX MOTOR BEHAVIOR  d1 23:44:23		Patient Event  d1 23:44:26		Seizure onset  d1 23:44:26		Patient Event  d1 23:44:27		EVOLVING ALPHA/THETA AT VERTEX, CZ  d1 23:44:35		VOCALIZATION  d1 23:44:47		SIGN OF 4 WITH LEFT ARM EXTENSION  d1 23:45:08		CLONIC PHASE  d1 23:45:36		LAST CLONIC JERK IN RIGHT ARM  d1 23:45:37		offset (electrographic)  d1 23:45:42		POSTICTAL EYE DEVIATION TO THE LEFT    -Aura (patient pressed seizure button at 00:49 with aura, no associated EEG changes)      Artifacts:  Intermittent myogenic and movement artifacts were noted.    ECG:  The heart rate on single channel ECG was predominantly between 60-80 BPM.      EEG Summary:  Abnormal EEG in the awake, drowsy and asleep states.  - Focal seizure with impaired awareness and complex motor behavior (hyperventilation, vocalization, complex movements of limbs, sign of 4 with left arm extension, last clonic jerk in right upper extremity) followed by secondarily generalized tonic-clonic seizure.  Clinical onset preceded ictal EEG onset by 11 seconds.  Ictal EEG onset at vertex, with rhythmic alpha and theta activity seen maximally at Cz  -Aura (patient pressed seizure button at 00:49 with aura, no associated EEG changes)      Impression/Clinical Correlate:  One focal seizure with secondary generalization recorded with EEG onset at the vertex and lateralizing semiology to the right hemisphere.  One aura recorded with no associated EEG changes.      Bebeto Painter MD  Neurology Attending Physician

## 2021-06-11 NOTE — PROGRESS NOTE ADULT - ASSESSMENT
51 f with    Hyponatremia improving   - fluid restrict  - follow Na , lytes  - Nephrology follow    Seizures  - continue Rx  - EEG in progress  - Neurology follow    Hypothyroid  - follow TSH  - Synthroid 50 mcg    Depression  - continue Rx    DVT prophylaxis     David Grey MD pager [phone]

## 2021-06-11 NOTE — PROVIDER CONTACT NOTE (OTHER) - ASSESSMENT
pt with GTC lasting ~2.5 minutes, tongue biting episode, bleeding from mouth, see flowsheet for vital, pt tachy in 150's, not responding

## 2021-06-11 NOTE — PROGRESS NOTE ADULT - ASSESSMENT
51F hx seizures, depression p/w hyponatremia. Pt w/ last seizure in mid-may, started on oxcarbazepine BID. Noted on routine labwork y/d to be hyponatremic to 127.    Hyponatremia  Likely related to Oxcarbazepine (up to 10% of cases have hyponatremia)  much improved  fluid restriction of 1000cc  NaCl 2gr BID -> stop for now  BMP qD  stop Oxcarbazepine  continue Levothyroxine        For any question, call:  Cell # [phone]  Pager # [phone]  Callback # [phone]

## 2021-06-11 NOTE — PROGRESS NOTE ADULT - ATTENDING COMMENTS
R FT seizure  stable now  will continue Vimpat 200 bid and will start Xcopri 12.5 -25 titration pack

## 2021-06-11 NOTE — PROVIDER CONTACT NOTE (MEDICATION) - ACTION/TREATMENT ORDERED:
PHILL Garcia aware. PHILL Garcia said medication is to be picked up at [facility]. PHILL Garcia said pt will have to start medication tomorrow 6/12 once picked up from [facility]

## 2021-06-11 NOTE — PROGRESS NOTE ADULT - SUBJECTIVE AND OBJECTIVE BOX
[facility] - RENETTA Henson / RENETTA Santos / OJ Rose/ RENETTA Staley/ RENETTA Michael/ FOX Sanchez / CHU Crum / JOSUE Abrams  ---------------------------------------------------------------------------------------------------------------  seen and examined today for hyponatremia  Interval : NAD  VITALS:  T(F): 98.1 (06-11-21 @ 05:36), Max: 98.8 (06-10-21 @ 19:08)  HR: 94 (06-11-21 @ 05:36)  BP: 108/72 (06-11-21 @ 05:36)  RR: 18 (06-11-21 @ 05:36)  SpO2: 95% (06-11-21 @ 05:36)  Wt(kg): --    06-10 @ 07:01  -  06-11 @ 07:00  --------------------------------------------------------  IN: 290 mL / OUT: 600 mL / NET: -310 mL      Physical Exam :-  Constitutional: NAD  Neck: Supple.  Respiratory: Bilateral equal breath sounds,  Cardiovascular: S1, S2 normal,  Gastrointestinal: Bowel Sounds present, soft, non tender.  Extremities: No edema  Neurological: Alert and Oriented x 3, no focal deficits  Psychiatric: Normal mood, normal affect  Data:-  Allergies :   lamotrigine (Rash)    Hospital Medications:   MEDICATIONS  (STANDING):  heparin   Injectable 5000 Unit(s) SubCutaneous every 12 hours  lacosamide IVPB 150 milliGRAM(s) IV Intermittent every 12 hours  levothyroxine 50 MICROGram(s) Oral daily  multivitamin 1 Tablet(s) Oral daily  PARoxetine 30 milliGRAM(s) Oral daily    06-11    136  |  103  |  15  ----------------------------<  116<H>  4.1   |  21<L>  |  0.66    Ca    9.1      11 Jun 2021 06:10  Phos  3.2     06-11  Mg     2.2     06-11    TPro  7.3  /  Alb  4.3  /  TBili  0.2  /  DBili      /  AST  21  /  ALT  33  /  AlkPhos  74  06-10    Creatinine Trend: 0.66 <--, 0.67 <--, 0.60 <--, 0.66 <--                        13.0   7.32  )-----------( 263      ( 10 Jeevan 2021 06:23 )             38.7

## 2021-06-12 VITALS
TEMPERATURE: 98 F | RESPIRATION RATE: 18 BRPM | HEART RATE: 93 BPM | OXYGEN SATURATION: 94 % | DIASTOLIC BLOOD PRESSURE: 74 MMHG | SYSTOLIC BLOOD PRESSURE: 125 MMHG

## 2021-06-12 LAB
ANION GAP SERPL CALC-SCNC: 13 MMOL/L — SIGNIFICANT CHANGE UP (ref 5–17)
BUN SERPL-MCNC: 11 MG/DL — SIGNIFICANT CHANGE UP (ref 7–23)
CALCIUM SERPL-MCNC: 9.2 MG/DL — SIGNIFICANT CHANGE UP (ref 8.4–10.5)
CHLORIDE SERPL-SCNC: 102 MMOL/L — SIGNIFICANT CHANGE UP (ref 96–108)
CO2 SERPL-SCNC: 22 MMOL/L — SIGNIFICANT CHANGE UP (ref 22–31)
CREAT SERPL-MCNC: 0.66 MG/DL — SIGNIFICANT CHANGE UP (ref 0.5–1.3)
GLUCOSE SERPL-MCNC: 95 MG/DL — SIGNIFICANT CHANGE UP (ref 70–99)
POTASSIUM SERPL-MCNC: 4 MMOL/L — SIGNIFICANT CHANGE UP (ref 3.5–5.3)
POTASSIUM SERPL-SCNC: 4 MMOL/L — SIGNIFICANT CHANGE UP (ref 3.5–5.3)
SODIUM SERPL-SCNC: 137 MMOL/L — SIGNIFICANT CHANGE UP (ref 135–145)

## 2021-06-12 PROCEDURE — 84443 ASSAY THYROID STIM HORMONE: CPT

## 2021-06-12 PROCEDURE — 83735 ASSAY OF MAGNESIUM: CPT

## 2021-06-12 PROCEDURE — 80048 BASIC METABOLIC PNL TOTAL CA: CPT

## 2021-06-12 PROCEDURE — 81001 URINALYSIS AUTO W/SCOPE: CPT

## 2021-06-12 PROCEDURE — 84300 ASSAY OF URINE SODIUM: CPT

## 2021-06-12 PROCEDURE — 86769 SARS-COV-2 COVID-19 ANTIBODY: CPT

## 2021-06-12 PROCEDURE — 84560 ASSAY OF URINE/URIC ACID: CPT

## 2021-06-12 PROCEDURE — C9254: CPT

## 2021-06-12 PROCEDURE — 82436 ASSAY OF URINE CHLORIDE: CPT

## 2021-06-12 PROCEDURE — 95700 EEG CONT REC W/VID EEG TECH: CPT

## 2021-06-12 PROCEDURE — U0005: CPT

## 2021-06-12 PROCEDURE — 95715 VEEG EA 12-26HR INTMT MNTR: CPT

## 2021-06-12 PROCEDURE — 82533 TOTAL CORTISOL: CPT

## 2021-06-12 PROCEDURE — 83935 ASSAY OF URINE OSMOLALITY: CPT

## 2021-06-12 PROCEDURE — 82550 ASSAY OF CK (CPK): CPT

## 2021-06-12 PROCEDURE — U0003: CPT

## 2021-06-12 PROCEDURE — 80053 COMPREHEN METABOLIC PANEL: CPT

## 2021-06-12 PROCEDURE — 85025 COMPLETE CBC W/AUTO DIFF WBC: CPT

## 2021-06-12 PROCEDURE — 84100 ASSAY OF PHOSPHORUS: CPT

## 2021-06-12 PROCEDURE — 99285 EMERGENCY DEPT VISIT HI MDM: CPT | Mod: 25

## 2021-06-12 PROCEDURE — 82570 ASSAY OF URINE CREATININE: CPT

## 2021-06-12 PROCEDURE — 71045 X-RAY EXAM CHEST 1 VIEW: CPT

## 2021-06-12 RX ADMIN — Medication 1 TABLET(S): at 10:21

## 2021-06-12 RX ADMIN — HEPARIN SODIUM 5000 UNIT(S): 5000 INJECTION INTRAVENOUS; SUBCUTANEOUS at 05:58

## 2021-06-12 RX ADMIN — LACOSAMIDE 140 MILLIGRAM(S): 50 TABLET ORAL at 05:58

## 2021-06-12 RX ADMIN — Medication 50 MICROGRAM(S): at 05:58

## 2021-06-12 NOTE — PROGRESS NOTE ADULT - SUBJECTIVE AND OBJECTIVE BOX
Patient is a 51y old  Female who presents with a chief complaint of hyponatremia (10 Jeevan 2021 16:26)      SUBJECTIVE / OVERNIGHT EVENTS: Comfortable without new complaints.   Review of Systems  chest pain no  palpitations no  sob no  nausea no  headache no    MEDICATIONS  (STANDING):  heparin   Injectable 5000 Unit(s) SubCutaneous every 12 hours  lacosamide IVPB 200 milliGRAM(s) IV Intermittent every 12 hours  levothyroxine 50 MICROGram(s) Oral daily  multivitamin 1 Tablet(s) Oral daily  PARoxetine 30 milliGRAM(s) Oral daily  Xcopri (Cenobamate) 12.5 milliGRAM(s) 12.5 milliGRAM(s) Oral at bedtime    MEDICATIONS  (PRN):  acetaminophen   Tablet .. 650 milliGRAM(s) Oral every 6 hours PRN Temp greater or equal to 38.5C (101.3F), Mild Pain (1 - 3)  lacosamide Injectable 200 milliGRAM(s) IV Push once PRN gtc > 3 mins if not responding to ativan  LORazepam   Injectable 2 milliGRAM(s) IV Push once PRN GTC or focal seizure > 3 min      Vital Signs Last 24 Hrs  T(C): 36.8 (12 Jun 2021 11:00), Max: 37.1 (11 Jun 2021 16:41)  T(F): 98.2 (12 Jun 2021 11:00), Max: 98.8 (11 Jun 2021 16:41)  HR: 93 (12 Jun 2021 11:00) (74 - 93)  BP: 125/74 (12 Jun 2021 11:00) (108/72 - 136/85)  BP(mean): --  RR: 18 (12 Jun 2021 11:00) (18 - 18)  SpO2: 94% (12 Jun 2021 11:00) (94% - 97%)    PHYSICAL EXAM:  GENERAL: NAD, well-developed  HEAD:  Atraumatic, Normocephalic  EYES: EOMI, PERRLA, conjunctiva and sclera clear  NECK: Supple, No JVD  CHEST/LUNG: Clear to auscultation bilaterally; No wheeze  HEART: Regular rate and rhythm; No murmurs, rubs, or gallops  ABDOMEN: Soft, Nontender, Nondistended; Bowel sounds present  EXTREMITIES:  2+ Peripheral Pulses, No clubbing, cyanosis, or edema  PSYCH: AAOx3  NEUROLOGY: non-focal  SKIN: No rashes or lesions    LABS:    06-12    137  |  102  |  11  ----------------------------<  95  4.0   |  22  |  0.66    Ca    9.2      12 Jun 2021 06:57  Phos  3.2     06-11  Mg     2.2     06-11                  RADIOLOGY & ADDITIONAL TESTS:    Imaging Personally Reviewed:    Consultant(s) Notes Reviewed:      Care Discussed with Consultants/Other Providers:

## 2021-06-12 NOTE — PROGRESS NOTE ADULT - ASSESSMENT
51 f with    Hyponatremia improving   - fluid restrict  - follow Na , lytes  - Nephrology follow    Seizures  - continue Rx  - Neurology follow    Hypothyroid  - follow TSH  - Synthroid 50 mcg    Depression  - continue Rx    DVT prophylaxis     David Grey MD pager [phone]

## 2021-06-12 NOTE — DISCHARGE NOTE NURSING/CASE MANAGEMENT/SOCIAL WORK - PATIENT PORTAL LINK FT
You can access the FollowMyHealth Patient Portal offered by [facility] by registering at the following website: [email or web address]. By joining [facility]’s FollowMyHealth portal, you will also be able to view your health information using other applications (apps) compatible with our system.

## 2021-06-16 LAB
CORTICOSTEROID BINDING GLOBULIN RESULT: 6.7 MG/DL — HIGH
CORTIS F/TOTAL MFR SERPL: 1.6 % — SIGNIFICANT CHANGE UP
CORTIS SERPL-MCNC: 24 UG/DL — HIGH
CORTISOL, FREE RESULT: 0.38 UG/DL — SIGNIFICANT CHANGE UP